# Patient Record
(demographics unavailable — no encounter records)

---

## 2025-02-17 NOTE — ERN
General


Chief Complaint:  Fever


Stated Complaint:  WANTS CHEST X-RAYS/LUNG


Time Seen by MD:  12:23


Source:  patient





History of Present Illness


Initial Comments


Patient is a 73-year-old male coming in to be evaluated for fever.  Per family 

member and patient he has been having fever for a couple of days.  He was 

evaluated at the urgent care and was told had an upper respiratory infection.  

Patient does has a history of cancer in his currently on chemotherapy.  Patient 

was to follow up at the emergency room per MD Gore for evaluation of a 

pneumonia.


Allergies:  


Coded Allergies:  


     No Known Allergies (Unverified  Allergy, Unknown, 22)


Home Meds


Active Scripts


Ibuprofen (Motrin/Advil) 800 Mg Tab, 800 MG PO TIDP PRN for PAIN, #30 TAB


   Prov:DEMAR CASSIDY MD         22


Lidocaine (Lidoderm Patch 5%) 1 Patch Patch, 1 PATCH TP DAILY PRN for PAIN, #10 

ADH.PATCH


   Prov:DEMAR CASSIDY MD         22


Cyclobenzaprine HCl (Flexeril) 10 Mg Tab, 10 MG PO TID, #30 TAB


   Prov:DEMAR CASSIDY MD         22


Reported Medications


Hydrochlorothiazide (Hydrochlorothiazide) 25 Mg Tablet, 25 MG PO DAILY, TAB


   22


Hydroxyzine HCl (Hydroxyzine HCl) 25 Mg Tablet, 25 MG PO DAILY, TAB


   22





Past Medical History


Past Medical History:  Cancer, Hepatitis


Medical History Other:  ESOPHAGEAL CA, METS


Past Surgical History:  Other


Surgical History Other:  HERNIA





Family History


Family History:  HTN





Social History


Social History:  ETOH, Lives with family





ROS Dictation


CONSTITUTIONAL:   chills,  fever,  weakness, no diaphoresis, no malaise.


HEAD/FACE:  No signs of trauma. 


EENT:  No eye pain, no blurred vision, no tearing, no double vision, no ear 

pain, no ear discharge, no nose pain, no nasal congestion, no throat pain, no 

throat swelling, no mouth pain. 


RESPIRATORY:   cough, no orthopnea, no SOB, no stridor, no wheezing. 


CARDIOVASCULAR:  No chest pain, no edema, no palpitations, no syncope. 


GASTROINTESTINAL/ABDOMINAL:   No abdominal pain, no constipation, no diarrhea, 

no nausea, no vomiting. 


GENITOURINARY:  No abnormal discharge, no dysuria, no frequent urination, no 

hematuria. No complaints of pain in the genitals. 


MUSCULOSKELETAL:  No back pain, no gout, no joint pain, no joint swelling, no 

muscle pain, no muscle stiffness, no neck pain. 


INTEGUMENTARY:  No change in color, no change in hair/nails, no dryness, no 

lesion, no lumps, no rash. 


NEUROLOGICAL/PSYCH:  No anxiety, not depressed, no emotional problem, no 

headache, no numbness, no pre-existing deficit, no history of seizures,  no 

tremors, no weakness. 


HEMATOLOGIC/LYMPHATIC:  Not anemic, no history of blood clots, no apparent 

bleeding, no bruising, glands not swollen.


All Systems Negative, Except as Noted.





Physical Exam


Physical Exam Dictation


VITAL SIGNS:  Reviewed. 


GENERAL APPEARANCE:  Alert, oriented x3, no acute distress, obese.


HEAD AND FACE: Non-traumatic. 


EYES:   PERRL, pink conjunctivas, eyelid no trauma, anterior chamber clear. 


EARS:  Pinnas intact and no signs of trauma or erythema.  Ear canals clear and 

no discharge. TMs no erythema. 


NOSE:  No discharge, no bleeding. 


OROPHARYNX:  Mouth normal, teeth no caries, tongue pink.  Pharynx clear, no 

erythema.  Tonsils no exudates, no abscesses noted. Mucous membrane moist.


NECK:  Supple, non-tender, no thyromegaly, no masses, no JVD, no bruits. 


BREAST:  Deferred.


CHEST:   No tenderness, no crepitus, no paradoxical movement, no retractions.


LUNGS:  Clear, well-ventilated, symmetric, no rales, no wheezing, no rhonchi, no

stridor, good breath sounds bilaterally. 


HEART:  Regular rate, regular rhythm, no murmur, no gallops. 


VASCULAR: No peripheral edema.


ABDOMEN: Soft, positive bowel sounds, nondistended, no guarding, nontender, no 

rebound, no masses no hepatomegaly, no splenomegaly, no Gallegos's sign, no 

hernias. 


RECTAL: Deferred. 


GENITAL:  Deferred. 


NEUROLOGICAL:  Normal speech, gross motor function intact, gross sensory 

function intact.


MUSCULOSKELETAL:  Neck nontender, full range of motion, back nontender, full 

range of motion.


EXTREMITIES: Nontender, full range of motion. 


SKIN:  Color pink, dry, no turgor, no rash, no lacerations, no abrasions, no 

contusions.


LYMPHATICS:  Deferred.





Results


Laboratory and Microbiology


Lab and Micro Result





Laboratory Tests








Test


 25


12:31 25


13:09 25


14:22


 


Influenza Type A Antigen


 Negative For


Type A 


 





 


Influenza Type B Antigen


 Negative For


Type B 


 





 


SARS-CoV-2, RNA, NAAT


 NEGATIVE SARS


CoV-2 


 





 


Group A Streptococcus Rapid


 negative


(NEGATIVE) 


 





 


White Blood Count


 


 0.4 K/uL


(4.8-10.8)  *L 





 


Red Blood Count


 


 3.90 MIL/uL


(4.50-6.20)  L 





 


Hemoglobin


 


 11.7 g/dL


(14.0-18.0)  L 





 


Hematocrit


 


 33.9 % (42-54)


L 





 


Mean Corpuscular Volume


 


 86.9 fL


(79-99) 





 


Mean Corpuscular Hemoglobin


 


 30.0 pg


(27.0-33.0) 





 


Mean Corpuscular Hemoglobin


Concent 


 34.5 g/dL


(32.0-36.0) 





 


Red Cell Distribution Width


 


 14.3 %


(11.0-15.5) 





 


Platelet Count


 


 92 K/uL


(130-400)  L 





 


Mean Platelet Volume


 


 10.5 fL


(7.5-10.5) 





 


Immature Granulocyte % (Auto)  0.0 % (0-1)   


 


Neutrophils (%) (Auto)


 


 7.4 %


(40.0-77.0)  L 





 


Lymphocytes (%) (Auto)


 


 78.0 %


(21.0-51.0)  H 





 


Monocytes (%) (Auto)


 


 12.2 %


(3.0-13.0) 





 


Eosinophils (%) (Auto)


 


 0.0 %


(0.0-8.0) 





 


Basophils (%) (Auto)


 


 2.4 %


(0.0-5.0) 





 


Neutrophils # (Auto)


 


 0.0 K/uL


(1.8-7.7)  L 





 


Lymphocytes # (Auto)


 


 0.3 K/uL


(1.0-4.8)  L 





 


Monocytes # (Auto)


 


 0.1 K/uL


(0.1-1.0) 





 


Eosinophils # (Auto)


 


 0.00 K/uL


(0.00-0.70) 





 


Basophils # (Auto)


 


 0.01 K/uL


(0.00-0.20) 





 


Absolute Immature Granulocyte


(auto 


 0.00 K/uL


(0-1) 





 


Segmented Neutrophils %  8 % (40-70)  L 


 


Band Neutrophils %  2 % (0-2)   


 


Lymphocytes % (Manual)  69 % (22-44)  H 


 


Monocytes % (Manual)  12 % (2-9)  H 


 


Eosinophils % (Manual)  2 % (1-6)   


 


Basophils % (Manual)  2 % (0-2)   


 


Metamyelocytes %  1 % (0-0)  H 


 


Nucleated Red Blood Cells


 


 0.0 %


(0.0-0.19) 





 


Differential Comment


 


 MANUAL


DIFFERENTIAL 





 


Reactive Lymphocytes  4 % (0-0)  H 


 


White Cell Morphology Comment  See comments   


 


Platelet Morphology Comment  DECREASED   


 


Red Blood Cell Morphology  See comments   


 


Prothrombin Time


 


 10.3 SEC


(9.6-11.6) 





 


Prothromb Time International


Ratio 


 <= 0.93


(0.85-1.15) 





 


Sodium Level


 


 123 mmol/L


(136-145)  L 





 


Potassium Level


 


 3.9 mmol/L


(3.5-5.1) 





 


Chloride Level


 


 93 mmol/L


(101-111)  L 





 


Carbon Dioxide Level


 


 26 mmol/L


(21-32) 





 


Blood Urea Nitrogen


 


 14 mg/dL


(7-18) 





 


Creatinine


 


 0.8 mg/dL


(0.5-1.3) 





 


Glomerular Filtration Rate


Calc 


 93 mL/min


(>90) 





 


Random Glucose


 


 116 mg/dL


()  H 





 


Lactic Acid Level


 


 1.7 mmol/L


(0.8-2.5) 





 


Total Calcium


 


 8.1 mg/dL


(8.5-10.1)  L 





 


Total Creatine Kinase


 


 56 U/L


() 





 


Troponin I High Sensitivity


 


 26 ng/L (4-75)


 





 


B-Type Natriuretic Peptide


 


 15 pg/mL


(0-100) 





 


Procalcitonin


 


 0.41 ng/mL


(0.05-0.5) 





 


Urine Color


 


 


 YELLOW


(YELLOW)


 


Urine Appearance   CLEAR (CLEAR)  


 


Urine pH   5.5 (5.0-8.0)  


 


Urine Specific Gravity


 


 


 1.029


(1.001-1.031)


 


Urine Protein


 


 


 20 mg/dL


(NEGATIVE)  H


 


Urine Glucose (UA)


 


 


 30 mg/dL


(NEGATIVE)  H


 


Urine Ketones


 


 


 10 mg/dL


(NEGATIVE)  H


 


Urine Occult Blood


 


 


 NEGATIVE


(NEGATIVE)


 


Urine Nitrate


 


 


 NEGATIVE


(NEGATIVE)


 


Urine Bilirubin


 


 


 NEGATIVE mg/dL


(NEGATIVE)


 


Urine Urobilinogen


 


 


 0.2 mg/dL


(0.2-1.0)


 


Urine Leukocyte Esterase


 


 


 NEGATIVE


Kalpesh/uL


 


Urine RBC


 


 


 2-5 /HPF (0-1)


H


 


Urine WBC


 


 


 2-5 /HPF (0-1)


H


 


Urine Squamous Epithelial


Cells 


 


 RARE /HPF


(0-2)


 


Urine Bacteria


 


 


 None /HPF


(None Seen)


 


Urine Other Casts


 


 


 2 /LPF (None


Seen)








Labs Reviewed?:  Yes





EKG/XRAY/US/CT/MRI


EKG Comment


2025 time 12:41 p.m.


Ventricular rate 104


Sinus tachycardia





No ST wave elevation or depression


X-RAY Comment


                            [facility]


                    [address] [place] [address]


                                 [phone]


                                        


                                 IMAGING REPORT


                                     Signed





PATIENT: NIKOLAY SIDHU                            MR#: [id number]


ACCOUNT#: [id number]                              : 1951


SEX: M AGE: 73                                      LOCATION: [facility]


ORDER DT: 25 1241                             STATUS: REG ER


ACCESSION#: [id number]                            REPORT#: [id number]


SERVICE DT: 25 1239





REASON: fever


ORDERING PHYSICIAN: MARIE DOZIER MD


PROCEDURE: CXR1VW  -  CHEST 1VW





CHEST 1VW





REASON: fever





COMPARISON: None.





FINDINGS:  


There is 2 cm well-circumscribed mass midportion of the right lung.


There is some faint nodularity in the left lung which could be some


smaller nodules. There are no acute appearing infiltrates. Heart size is


normal. There is a chest port in place. Be some and bony thorax appear


unremarkable.





IMPRESSION:





1. 2 cm nodule right midlung, there may be some smaller nodules present


in the left lung.


2. No evidence of an acute infiltrate.








DICTATED BY: EMMETT ARTHUR MD


DATE: 25 1318





ELECTRONICALLY SIGNED BY: EMMETT ARTHUR MD


DATE: 25 1322





MDM


MDM: 


DIFFERENTIAL DIAGNOSIS:  SEPSIS, HISTORY OF CANCER, IMMUNODEFICIENCY


RATIONALE: TESTS CONSIDERED AND ORDERED SECONDARY TO SHARED DECISION MAKING 

INCLUDE:


PREVIOUS OUTSIDE RECORDS REVIEWED: OLD ER VISITS.


RISK OF COMPLICATION AND/OR MORBIDITY OR MORTALITY OF PATIENT MANAGEMENT: NONE


MEDICATIONS-PER MEDICATION RECONCILIATION


NEED FOR HOSPITALIZATION: PATIENT DOES MEET CRITERIA FOR HOSPITALIZATION.


NEED FOR EMERGENCY MAJOR/MINOR SURGERY: NO





THERE ARE NO SOCIAL CONCERNS WITH THIS PATIENT.





PRESCRIPTION DRUG MANAGEMENT


PRESCRIPTIONS WILL INCLUDE SYMPTOMATIC CARE





PATIENT'S PRIOR EXTERNAL MEDICAL RECORDS FROM OTHER ER VISITS WERE REVIEWED BY 

ME AS INDICATED.  PRIOR TESTING AND RESULTS FROM PREVIOUS VISITS WERE REVIEWED. 

PRIOR TESTS WERE TAKEN INTO ACCOUNT WITH MEDICAL DECISION MAKING AND RESOURCE 

UTILIZATION, INDEPENDENT HISTORIAN/HISTORIANS WERE USED TO OBTAIN COMPLETE 

MEDICAL HISTORY.





I INDEPENDENTLY INTERPRETED THE TEST THAT WERE PERFORMED, RESULTS WERE REVIEWED 

BY ME AND CONSIDERED FINDINGS ON RADIOLOGY IF ORDERED.





MEDICAL MANAGEMENT AND EXAMINATION INTERPRETATION DISCUSSIONS WERE HAD BY ME 

WITH OTHER QUALIFIED HEALTHCARE PROFESSIONALS AS INDICATED FOR THE PATIENT'S 

CARE.  PATIENT WILL BE ADMITTED UNDER THE CARE OF DR. POOL FOR ONGOING 

EVALUATION AND MANAGEMENT OF SEPSIS WITH IMMUNODEFICIENCY


ED Course





Orders








Procedure Category Date Status





  Time 


 


12 Lead Ekg Tracing- EKG 25 Complete





Technical  12:33 


 


Cbc With Differential LAB 25 Complete





  12:33 


 


Prothrombin Time With LAB 25 Complete





INR  12:33 


 


Blood Cult JANINE 25 In Process





  12:33 


 


Urinalysis Profile LAB 25 Complete





  12:33 


 


Culture Urine JANINE 25 In Process





  12:33 


 


Acetaminophen 500mg PHA 25 Complete





Tab (Tylenol 500mg T  13:00 


 


Creatine Kinase, Total LAB 25 Complete





  12:33 


 


Troponin I High LAB 25 Complete





Sensitivity  12:33 


 


B-Type Natriuretic LAB 25 Complete





Peptide  12:33 


 


Procalcitonin LAB 25 Complete





  12:33 


 


Lactic Acid LAB 25 Complete





  12:33 


 


Ceftriaxone 1g Vial PHA 25 Complete





 (Rocephine 1g Inj)  13:00 


 


Basic Metabolic Panel LAB 25 Complete





  12:33 


 


Covid Rna Naat LAB 25 Complete





  12:33 


 


Influenza Type A & B, LAB 25 Complete





Rapid  12:33 


 


Febrile Agglutinins LAB 25 In Process





Panel  12:33 


 


Chest 1vw RAD 25 Resulted





  12:39 


 


Rapid (Group A Strep) LAB 25 Complete





  12:41 


 


Manual Differential LAB 25 Complete





  13:09 


 


0.9%Nacl 1000ml (Ns PHA 25 Complete





1000ml)  15:30 








Current Medications








 Medications


  (Trade)  Dose


 Ordered  Sig/Lobo


 Route


 PRN Reason  Start Time


 Stop Time Status Last Admin


Dose Admin


 


 Acetaminophen


  (TYLenol 500MG


 TAB)  1,000 mg  ONCE  ONCE


 PO


   25 13:00


 25 13:01 DC 25 14:14





 


 Ceftriaxone Sodium


  (ROCEphine 1G


 INJ)  1 gm  ONCE  ONCE


 IVPB


   25 13:00


 25 13:01 DC 25 14:09





 


 Sodium Chloride  1,000 ml @ 


 0 mls/hr  ONCE  ONCE


 IV


   25 15:30


 25 15:31 DC 25 15:27











Vital Signs








  Date Time  Temp Pulse Resp B/P (MAP) Pulse Ox O2 Delivery O2 Flow Rate FiO2


 


25 16:15 99.0 89 18 110/65 98 Room Air* 0 21


 


25 15:31 99.1 98 18 100/65 98 Room Air* 0 21


 


25 14:23 99.9 107 20 117/62 98 Room Air* 0 21


 


25 12:33 101.1 109 20 134/68 98 Room Air 0 











Critical Care Note


Comments


CRITICAL CARE PROCEDURE NOTE


AUTHORIZED AND PERFORMED BY: ME


TOTAL CRITICAL CARE TIME: APPROXIMATELY 36 MINUTES


DUE TO A HIGH PROBABILITY OF CLINICALLY SIGNIFICANT, LIFE THREATENING 

DETERIORATION, THE PATIENT REQUIRED MY HIGHEST LEVEL OF PREPAREDNESS TO 

INTERVENE EMERGENTLY AND I PERSONALLY SPENT THIS CRITICAL CARE TIME DIRECTLY AND

PERSONALLY MANAGING THE PATIENT. THIS CRITICAL CARE TIME INCLUDED OBTAINING A 

HISTORY; EXAMINING THE PATIENT; PULSE OXIMETRY; ORDERING AND REVIEW OF STUDIES; 

ARRANGING URGENT TREATMENT WITH DEVELOPMENT OF A MANAGEMENT PLAN; EVALUATION OF 

PATIENT'S RESPONSE TO TREATMENT; FREQUENT REASSESSMENT; AND, DISCUSSIONS WITH 

OTHER PROVIDERS.


THIS CRITICAL CARE TIME WAS PERFORMED TO ASSESS AND MANAGE THE HIGH PROBABILITY 

OF IMMINENT, LIFE-THREATENING DETERIORATION THAT COULD RESULT IN MULTI-ORGAN 

FAILURE. IT WAS EXCLUSIVE OF SEPARATELY BILLABLE PROCEDURES AND TREATING OTHER 

PATIENTS AND TEACHING TIME.


PLEASE SEE MDM SECTION AND THE REST OF THE NOTE FOR FURTHER INFORMATION ON 

PATIENT ASSESSMENT AND TREATMENT.





DX & DISP


Disposition:  Inpatient


Decision to Admit Date:  2025


Decision to Admit Time:  16:32


Departure


Impression:  


   Primary Impression:  Sepsis


   Additional Impressions:  Immunodeficiency, Maintenance chemotherapy


Condition:  Stable


Referrals:  


GOMEZ DEVLIN MD (PCP)


Time of Disposition:  16:31











MARIE DOZIER MD              2025 12:58

## 2025-02-17 NOTE — NUR
SPOKE TO DR. VINCENT SADLER (PTS ONCOLOGIST IN [place]) PER ANSWERING 
SERVICE, SHE WILL SUBMIT REQUEST FOR PHYSCIAN TO COME SEE PT.

## 2025-02-17 NOTE — EKG
[facility]

                                       

Test Date:    2025               Test Time:    12:41:44

Pat Name:     NIKOLAY SIDHU            Department:   [facility]

Patient ID:   [id number]           Room:         ED

Gender:       M                        Technician:   [id number]

:          1951               Requested By: MARIE DOZIER

Order Number: [id number]        Reading MD:   Philippe Hassan

                                 Measurements

Intervals                              Axis          

Rate:         104                      P:            59

FL:           131                      QRS:          59

QRSD:         85                       T:            55

QT:           331                                    

QTc:          435                                    

                           Interpretive Statements

Sinus tachycardia

No previous ECG available for comparison

Electronically Signed On 2025 06:56:45 CST by Philippe Hassan



Please click the below link to view image of tracing.

## 2025-02-17 NOTE — HP
HISTORY AND PHYSICAL NOTE


DATE OF CONSULTATION:


2/17/25


REASON FOR CONSULTATION:


fever cough and expectoration


HISTORY OF PRESENT ILLNESS:


Patient is a 73-year-old male coming in to be evaluated for fever.  Per family 

member and patient he has been having fever for a couple of days.  He was 

evaluated at the urgent care and was told had an upper respiratory infection.  

Patient does has a history of cancer in his currently on chemotherapy.  Patient 

was to follow up at the emergency room per MD Gore for evaluation of a 

pneumonia.


Allergies:  


Coded Allergies:  


     No Known Allergies (Unverified  Allergy, Unknown, 9/24/22)


Home Meds


Active Scripts


Ibuprofen (Motrin/Advil) 800 Mg Tab, 800 MG PO TIDP PRN for PAIN, #30 TAB


   Prov:DEMAR CASSIDY MD         9/24/22


Lidocaine (Lidoderm Patch 5%) 1 Patch Patch, 1 PATCH TP DAILY PRN for PAIN, #10 

ADH.PATCH


   Prov:DEMAR CASSIDY MD         9/24/22


Cyclobenzaprine HCl (Flexeril) 10 Mg Tab, 10 MG PO TID, #30 TAB


   Prov:DEMAR CASSIDY MD         9/24/22


Reported Medications


Hydrochlorothiazide (Hydrochlorothiazide) 25 Mg Tablet, 25 MG PO DAILY, TAB


   9/24/22


Hydroxyzine HCl (Hydroxyzine HCl) 25 Mg Tablet, 25 MG PO DAILY, TAB


   9/24/22





Past History


Past Medical History


Past Medical History:  Cancer, Hepatitis


Medical History Other:  ESOPHAGEAL CA, METS


Past Surgical History:  Other


Surgical History Other:  HERNIA





Family History


Family History:  HTN





Social History


Social History:  ETOH, Lives with family





Review of Systems


ROS Dictation


CONSTITUTIONAL:   chills,  fever,  weakness, no diaphoresis, no malaise.


ALLERGIES:  


Coded Allergies:  


     No Known Allergies (Unverified  Allergy, Unknown, 9/24/22)


HOME MEDS:


Active Scripts


Ibuprofen (Motrin/Advil) 800 Mg Tab, 800 MG PO TIDP PRN for PAIN, #30 TAB


   Prov:DEMAR CASSIDY MD         9/24/22


Lidocaine (Lidoderm Patch 5%) 1 Patch Patch, 1 PATCH TP DAILY PRN for PAIN, #10 

ADH.PATCH


   Prov:DEMAR CASSIDY MD         9/24/22


Cyclobenzaprine HCl (Flexeril) 10 Mg Tab, 10 MG PO TID, #30 TAB


   Prov:DEMAR CASSIDY MD         9/24/22


Reported Medications


Hydrochlorothiazide (Hydrochlorothiazide) 25 Mg Tablet, 25 MG PO DAILY, TAB


   9/24/22


Hydroxyzine HCl (Hydroxyzine HCl) 25 Mg Tablet, 25 MG PO DAILY, TAB


   9/24/22


INPATIENT MEDS:





Current Medications








 Medications  Dose


 Ordered  Sig/Lobo  Start Time


 Stop Time Status Last Admin


 


 Albuterol  1 udvial  [id number]  2/17/25 18:00


 3/19/25 17:59  2/17/25 19:22





 


 Albuterol  1 udvial  Q4HPRN  PRN  2/17/25 17:00


 3/19/25 16:59   





 


 Azithromycin  250 ml @ 


 250 mls/hr  Q24H  2/17/25 17:00


 2/27/25 16:59  2/17/25 17:38





 


 Enoxaparin Sodium  40 mg  DAILY  2/18/25 09:00


 3/20/25 08:59   





 


 Ceftriaxone Sodium  1 gm  Q24H  2/18/25 13:00


 2/28/25 12:59   





 


 Hydromorphone HCl  0.5 mg  Q3H3  PRN  2/17/25 22:00


 2/22/25 21:59   





 


 Acetaminophen  650 mg  Q6H  PRN  2/17/25 22:00


 3/19/25 21:59  2/17/25 21:46





 


 Acetaminophen  650 mg  Q6H  PRN  2/17/25 22:00


 3/19/25 21:59   





 


 Ondansetron HCl  4 mg  Q6H  PRN  2/17/25 22:00


 3/19/25 21:59   











VITAL SIGNS





Vital Signs








  Date Time  Temp Pulse Resp B/P (MAP) Pulse Ox O2 Delivery O2 Flow Rate FiO2


 


2/17/25 20:31 99.3 91 20 118/54 98 Room Air* 0 21 2/17/25 19:29  87 18   N/A Room Air  21 2/17/25 19:28  87 18     


 


2/17/25 18:00 99.0 89 18 114/70 97 Room Air* 0 21 2/17/25 16:15 99.0 89 18 110/65 98 Room Air* 0 21 2/17/25 15:31 99.1 98 18 100/65 98 Room Air* 0 21 2/17/25 14:23 99.9 107 20 117/62 98 Room Air* 0 21 2/17/25 12:33 101.1 109 20 134/68 98 Room Air 0 








PHYSICAL EXAM


HEAD/FACE:  No signs of trauma. 


EENT:  No eye pain, no blurred vision, no tearing, no double vision, no ear 

pain, no ear discharge, no nose pain, no nasal congestion, no throat pain, no 

throat swelling, no mouth pain. 


RESPIRATORY:   cough, no orthopnea, no SOB, no stridor, no wheezing. 


CARDIOVASCULAR:  No chest pain, no edema, no palpitations, no syncope. 


GASTROINTESTINAL/ABDOMINAL:   No abdominal pain, no constipation, no diarrhea, 

no nausea, no vomiting. 


GENITOURINARY:  No abnormal discharge, no dysuria, no frequent urination, no 

hematuria. No complaints of pain in the genitals. 


MUSCULOSKELETAL:  No back pain, no gout, no joint pain, no joint swelling, no 

muscle pain, no muscle stiffness, no neck pain. 


INTEGUMENTARY:  No change in color, no change in hair/nails, no dryness, no 

lesion, no lumps, no rash. 


NEUROLOGICAL/PSYCH:  No anxiety, not depressed, no emotional problem, no 

headache, no numbness, no pre-existing deficit, no history of seizures,  no 

tremors, no weakness. 


HEMATOLOGIC/LYMPHATIC:  Not anemic, no history of blood clots, no apparent 

bleeding, no bruising, glands not swollen.


All Systems Negative, Except as Noted.


LABORATORY RESULTS


Laboratory Tests


2/17/25 12:31: 


Influenza Type A Antigen Negative For Type A, Influenza Type B Antigen Negative 

For Type B, SARS-CoV-2, RNA, NAAT NEGATIVE SARS CoV-2, Group A Streptococcus 

Rapid negative


2/17/25 13:09: 


White Blood Count 0.4, Red Blood Count 3.90, Hemoglobin 11.7, Hematocrit 33.9, 

Mean Corpuscular Volume 86.9, Mean Corpuscular Hemoglobin 30.0, Mean Corpuscular

Hemoglobin Concent 34.5, Red Cell Distribution Width 14.3, Platelet Count 92, 

Mean Platelet Volume 10.5, Immature Granulocyte % (Auto) 0.0, Neutrophils (%) 

(Auto) 7.4, Lymphocytes (%) (Auto) 78.0, Monocytes (%) (Auto) 12.2, Eosinophils 

(%) (Auto) 0.0, Basophils (%) (Auto) 2.4, Neutrophils # (Auto) 0.0, Lymphocytes 

# (Auto) 0.3, Monocytes # (Auto) 0.1, Eosinophils # (Auto) 0.00, Basophils # (

Auto) 0.01, Absolute Immature Granulocyte (auto 0.00, Segmented Neutrophils % 8,

Band Neutrophils % 2, Lymphocytes % (Manual) 69, Monocytes % (Manual) 12, 

Eosinophils % (Manual) 2, Basophils % (Manual) 2, Metamyelocytes % 1, Nucleated 

Red Blood Cells 0.0, Differential Comment MANUAL DIFFERENTIAL, Reactive 

Lymphocytes 4, White Cell Morphology Comment See comments, Platelet Morphology 

Comment DECREASED, Red Blood Cell Morphology See comments, Prothrombin Time 

10.3, Prothromb Time International Ratio <= 0.93, Sodium Level 123, Potassium 

Level 3.9, Chloride Level 93, Carbon Dioxide Level 26, Blood Urea Nitrogen 14, 

Creatinine 0.8, Glomerular Filtration Rate Calc 93, Random Glucose 116, Lactic 

Acid Level 1.7, Total Calcium 8.1, Total Creatine Kinase 56, Troponin I High 

Sensitivity 26, B-Type Natriuretic Peptide 15, Procalcitonin 0.41


2/17/25 14:22: 


Urine Color YELLOW, Urine Appearance CLEAR, Urine pH 5.5, Urine Specific Gravity

1.029, Urine Protein 20, Urine Glucose (UA) 30, Urine Ketones 10, Urine Occult 

Blood NEGATIVE, Urine Nitrate NEGATIVE, Urine Bilirubin NEGATIVE, Urine 

Urobilinogen 0.2, Urine Leukocyte Esterase NEGATIVE, Urine RBC 2-5, Urine WBC 2-

5, Urine Squamous Epithelial Cells RARE, Urine Bacteria None, Urine Other Casts 

2


PROBLEM LIST:  


(1) Pneumonia


ICD Codes:  J18.9 - Pneumonia, unspecified organism


(2) Sepsis


ICD Codes:  A41.9 - Sepsis, unspecified organism


(3) Immunodeficiency


ICD Codes:  D84.9 - Immunodeficiency, unspecified


(4) Maintenance chemotherapy


ICD Codes:  Z51.11 - Encounter for antineoplastic chemotherapy


PLAN


IV antibiotics


 hydrate


 consult oncology











IZZY REAGAN MD              Feb 17, 2025 22:09

## 2025-02-17 NOTE — [facility]
CHEST 1VW



REASON: fever



COMPARISON: None.



FINDINGS:  

There is 2 cm well-circumscribed mass midportion of the right lung.

There is some faint nodularity in the left lung which could be some

smaller nodules. There are no acute appearing infiltrates. Heart size is

normal. There is a chest port in place. Be some and bony thorax appear

unremarkable.



IMPRESSION:



1. 2 cm nodule right midlung, there may be some smaller nodules present

in the left lung.

2. No evidence of an acute infiltrate.

## 2025-02-18 NOTE — NUR
RECEIVED A CALL FROM [facility], DR. SADLER DOES NOT COME TO [facility], SHE WILL RELAY THE MESSAGE TO DR. LIMON OR DR. MENDEZ TO COME SEE 
PT.

## 2025-02-18 NOTE — PN
PROGRESS NOTE


PROGRESS NOTE


DATE OF PROGRESS NOTE:


2/18/25


SUBJECTIVE:


No new complaints


VITAL SIGNS





Vital Signs








  Date Time  Temp Pulse Resp B/P (MAP) Pulse Ox O2 Delivery O2 Flow Rate FiO2


 


2/18/25 20:00 99.7 88 19 133/65 99 Room Air  


 


2/18/25 19:41        21


 


2/18/25 15:27       0.0 








PHYSICAL EXAM:


HEAD/FACE:  No signs of trauma. 


EENT:  No eye pain, no blurred vision, no tearing, no double vision, no ear 

pain, no ear discharge, no nose pain, no nasal congestion, no throat pain, no 

throat swelling, no mouth pain. 


RESPIRATORY:   cough, no orthopnea, no SOB, no stridor, no wheezing. 


CARDIOVASCULAR:  No chest pain, no edema, no palpitations, no syncope. 


GASTROINTESTINAL/ABDOMINAL:   No abdominal pain, no constipation, no diarrhea, 

no nausea, no vomiting. 


GENITOURINARY:  No abnormal discharge, no dysuria, no frequent urination, no 

hematuria. No complaints of pain in the genitals. 


MUSCULOSKELETAL:  No back pain, no gout, no joint pain, no joint swelling, no 

muscle pain, no muscle stiffness, no neck pain. 


INTEGUMENTARY:  No change in color, no change in hair/nails, no dryness, no 

lesion, no lumps, no rash. 


NEUROLOGICAL/PSYCH:  No anxiety, not depressed, no emotional problem, no 

headache, no numbness, no pre-existing deficit, no history of seizures,  no 

tremors, no weakness. 


HEMATOLOGIC/LYMPHATIC:  Not anemic, no history of blood clots, no apparent 

bleeding, no bruising, glands not swollen.


All Systems Negative, Except as Noted.


LABORATORY:





                              Laboratory Result(s)








Test


 2/18/25


06:59


 


White Blood Count


 0.8 K/uL


(4.8-10.8)


 


Red Blood Count


 3.49 MIL/uL


(4.50-6.20)


 


Hemoglobin


 10.6 g/dL


(14.0-18.0)


 


Hematocrit 30.6 % (42-54) 


 


Mean Corpuscular Volume


 87.7 fL


(79-99)


 


Mean Corpuscular Hemoglobin


 30.4 pg


(27.0-33.0)


 


Mean Corpuscular Hemoglobin


Concent 34.6 g/dL


(32.0-36.0)


 


Red Cell Distribution Width


 14.1 %


(11.0-15.5)


 


Platelet Count


 85 K/uL


(130-400)


 


Mean Platelet Volume


 10.3 fL


(7.5-10.5)


 


Immature Granulocyte % (Auto) 0.0 % (0-1) 


 


Neutrophils (%) (Auto)


 3.8 %


(40.0-77.0)


 


Lymphocytes (%) (Auto)


 80.8 %


(21.0-51.0)


 


Monocytes (%) (Auto)


 15.4 %


(3.0-13.0)


 


Eosinophils (%) (Auto)


 0.0 %


(0.0-8.0)


 


Basophils (%) (Auto)


 0.0 %


(0.0-5.0)


 


Neutrophils # (Auto)


 0.0 K/uL


(1.8-7.7)


 


Lymphocytes # (Auto)


 0.6 K/uL


(1.0-4.8)


 


Monocytes # (Auto)


 0.1 K/uL


(0.1-1.0)


 


Eosinophils # (Auto)


 0.00 K/uL


(0.00-0.70)


 


Basophils # (Auto)


 0.00 K/uL


(0.00-0.20)


 


Absolute Immature Granulocyte


(auto 0.00 K/uL


(0-1)


 


Nucleated Red Blood Cells


 0.0 %


(0.0-0.19)


 


White Cell Morphology Comment See comments 


 


Sodium Level


 126 mmol/L


(136-145)


 


Potassium Level


 4.0 mmol/L


(3.5-5.1)


 


Chloride Level


 97 mmol/L


(101-111)


 


Carbon Dioxide Level


 24 mmol/L


(21-32)


 


Blood Urea Nitrogen 9 mg/dL (7-18) 


 


Creatinine


 0.6 mg/dL


(0.5-1.3)


 


Glomerular Filtration Rate


Calc 102 mL/min


(>90)


 


Random Glucose


 103 mg/dL


()


 


Total Calcium


 7.7 mg/dL


(8.5-10.1)








INPATIENT MEDS:





Current Medications








 Medications  Dose


 Ordered  Sig/Lobo  Start Time


 Stop Time Status Last Admin


 


 Albuterol  1 udvial  [id number]  2/17/25 18:00


 3/19/25 17:59  2/18/25 19:36





 


 Albuterol  1 udvial  Q4HPRN  PRN  2/17/25 17:00


 3/19/25 16:59   





 


 Azithromycin  250 ml @ 


 250 mls/hr  Q24H  2/17/25 17:00


 2/22/25 16:59  2/18/25 17:29





 


 Enoxaparin Sodium  40 mg  DAILY  2/18/25 09:00


 3/20/25 08:59  2/18/25 09:54





 


 Hydromorphone HCl  0.5 mg  Q3H3  PRN  2/17/25 22:00


 2/22/25 21:59   





 


 Acetaminophen  650 mg  Q6H  PRN  2/17/25 22:00


 3/19/25 21:59  2/18/25 16:27





 


 Acetaminophen  650 mg  Q6H  PRN  2/17/25 22:00


 3/19/25 21:59   





 


 Ondansetron HCl  4 mg  Q6H  PRN  2/17/25 22:00


 3/19/25 21:59   





 


 Cefepime HCl  2 gm  Q12H  2/18/25 14:00


 2/28/25 13:59  2/18/25 14:38





 


 Fluconazole/


 Sodium Chloride  400 mg  Q24H  2/18/25 14:00


 2/28/25 13:59  2/18/25 15:20











PROBLEM LIST:  


(1) Pneumonia


ICD Code:  J18.9 - Pneumonia, unspecified organism


(2) Sepsis


ICD Code:  A41.9 - Sepsis, unspecified organism


(3) Immunodeficiency


ICD Code:  D84.9 - Immunodeficiency, unspecified


(4) Maintenance chemotherapy


ICD Code:  Z51.11 - Encounter for antineoplastic chemotherapy


PLAN:


IV antibiotics


 hydrate


 consult oncologyAnd pulmonology


Sepsis


Neutropenic fever


Severe neutropenia, POA


Esophageal CA with Mets


Immunodeficiency


Maintenance chemotherapy 


suspected community acquired pneumonia POA


History of hepatitis 


Hypertension 


Chronic Generalized rash to face and scalp worsening, POA











IZZY REAGAN MD              Feb 18, 2025 22:41

## 2025-02-18 NOTE — HMCIMG
CT CHEST W/O CONTRAST



REASON:  rule out ILD



COMPARISON: None.



TECHNIQUE:

Multiple sequential axial images of the chest were obtained from the

thoracic inlet through the upper pole of the kidneys without intravenous

contrast administration.



FINDINGS:

There are multiple bilateral pulmonary nodules consistent with

metastatic disease.  Many of the nodules are subcentimeter in size,

there are several additional nodules between 1 and 2 cm.  Lungs appear

otherwise unremarkable.  There is normal-appearing pulmonary

interstitial pattern.  There are no acute appearing infiltrates.



There are no pleural effusions.  Hilar and mediastinal structures appear

unremarkable.  Chest wall structures appear normal as do visualized

upper abdominal structures.  There are no focal osseous lesions.



IMPRESSION:



1.  Multiple bilateral pulmonary nodules consistent with metastatic

disease.

2.  Otherwise unremarkable exam.



CT was performed with one or more following dose reduction techniques:

automated exposure control, adjustment of the mA and kv according to

patient's size, or use of a iterative reconstruction technique.

## 2025-02-18 NOTE — CONS
[facility] CONSULTATION NOTE 





Date Patient Seen: 2025 


Time of Visit: 08:20


Supervising Physician: Yash Cadet MD


Reason for Consultation: Lung CA and Pneumonia





Primary Care Physician: Wojciech Harper MD 


Outpatient Specialists: 


Inpatient Consults: Jaquan Bills MD





PROBLEM LIST:


Sepsis


Neutropenic fever


Severe neutropenia, POA


Esophageal CA with Mets


Immunodeficiency


Maintenance chemotherapy 


suspected community acquired pneumonia POA


History of hepatitis 


Hypertension 


Chronic Generalized rash to face and scalp worsening, POA





HPI:


This is a 73-year-old male with a past medical history of esophageal cancer with

metastasis, with history of chemotherapy and immunocompromise, hypertension, and

hepatitis who presented to the ED for evaluation of fever for a couple of days. 

As per wife patient was evaluated at a urgent care clinic and was said he had an

upper respiratory infection and sent home.  As per patient's wife they called MD Gore for evaluation of a pneumonia and they recommended for patient to come 

in to the ED for further workup.  Patient was admitted for septic shock to the 

ICU we were consulted by primary team for critical care management.  Chest x-ray

2 cm nodule right midlung there may be some smaller nodules present in the left 

lung.  No evidence of acute infiltrate.  Multiple bilateral pulmonary nodules 

consistent with metastatic disease.  Otherwise unremarkable exam.  On CT chest 

multiple bilateral pulmonary nodules consistent with metastatic disease seen.  

Otherwise unremarkable exam.  On laboratory WBCs are 0.8 H&H is 10.6/30.6 

platelet count is 85 K. neutrophils of 3.8.  Pending oncology consult.  Na 126 

chloride 97 kidneys are good creatinine 0.6  total calcium 7.7.  For 

neutropenic fever we will cover with broad-spectrum antibiotics for now and 

consult ID.





PAST MEDICAL HX:


see above





PAST SURGICAL HX:


noncontributory





SOCIAL HISTORY:


No tobacco, ETOH, or illicit drug use


Coded Allergies:  


     No Known Allergies (Unverified  Allergy, Unknown, 22)





REVIEW OF SYSTEMS: 


12 point ROS reviewed with patient. Pertinent positives mentioned above. 

Otherwise negative.





PHYSICAL EXAM: 


GENERAL: alert, weak, awake oriented x 3


HEENT: EOMI, Sclera non icteric, moist mucosa , generalized rash to face and 

scalp


NECK: Supple, no JVD, trachea midline 


LUNGS: diminished  breath sounds bilaterally. No wheezes


HEART: Regular rate and rhythm. Normal S1 and S2, without murmurs 


ABD: Abdomen soft, nontender. Bowel sounds present


EXT: No clubbing cyanosis or edema


NEURO: Alert and oriented to person, follows commands





                             Vital Signs (last 8hr)








  Date Time  Temp Pulse Resp B/P (MAP) Pulse Ox O2 Delivery O2 Flow Rate FiO2


 


25 06:40  84 18   N/A Room Air  21


 


25 06:40  84 18     


 


25 03:42 99.1 92 20 118/54 98 Room Air* 0 21











LABS:





                                Hematology Labs:








Test


 25


06:59 25


13:09 Range/Units


 


 


White Blood Count 0.8 #*L  4.8-10.8  K/uL


 


Red Blood Count


 3.49 L


 


 4.50-6.20


MIL/uL


 


Hemoglobin 10.6 L  14.0-18.0  g/dL


 


Hematocrit 30.6 L  42-54  %


 


Mean Corpuscular Volume 87.7   79-99  fL


 


Mean Corpuscular Hemoglobin 30.4   27.0-33.0  pg


 


Mean Corpuscular Hemoglobin


Concent 34.6 


 


 32.0-36.0  g/dL





 


Red Cell Distribution Width 14.1   11.0-15.5  %


 


Platelet Count 85 L  130-400  K/uL


 


Mean Platelet Volume 10.3   7.5-10.5  fL


 


Immature Granulocyte % (Auto) 0.0   0-1  %


 


Neutrophils (%) (Auto) 3.8 L  40.0-77.0  %


 


Lymphocytes (%) (Auto) 80.8 H  21.0-51.0  %


 


Monocytes (%) (Auto) 15.4 H  3.0-13.0  %


 


Eosinophils (%) (Auto) 0.0   0.0-8.0  %


 


Basophils (%) (Auto) 0.0   0.0-5.0  %


 


Neutrophils # (Auto) 0.0 L  1.8-7.7  K/uL


 


Lymphocytes # (Auto) 0.6 L  1.0-4.8  K/uL


 


Monocytes # (Auto) 0.1   0.1-1.0  K/uL


 


Eosinophils # (Auto) 0.00   0.00-0.70  K/uL


 


Basophils # (Auto) 0.00   0.00-0.20  K/uL


 


Absolute Immature Granulocyte


(auto 0.00 


 


 0-1  K/uL





 


Nucleated Red Blood Cells 0.0   0.0-0.19  %


 


Segmented Neutrophils %  8 L 40-70  %


 


Band Neutrophils %  2  0-2  %


 


Lymphocytes % (Manual)  69 H 22-44  %


 


Monocytes % (Manual)  12 H 2-9  %


 


Eosinophils % (Manual)  2  1-6  %


 


Basophils % (Manual)  2  0-2  %


 


Metamyelocytes %  1 H 0-0  %


 


Differential Comment


 


 MANUAL


DIFFERENTIAL  





 


Reactive Lymphocytes  4 H 0-0  %


 


Platelet Morphology Comment  DECREASED   


 


Red Blood Cell Morphology  See comments   








                                 Chemistry Labs:








Test


 25


06:59 25


13:09 Range/Units


 


 


Sodium Level 126 L  136-145  mmol/L


 


Potassium Level 4.0   3.5-5.1  mmol/L


 


Chloride Level 97 L  101-111  mmol/L


 


Carbon Dioxide Level 24   21-32  mmol/L


 


Blood Urea Nitrogen 9   7-18  mg/dL


 


Creatinine 0.6   0.5-1.3  mg/dL


 


Glomerular Filtration Rate


Calc 102 


 


 >90  mL/min





 


Random Glucose 103     mg/dL


 


Total Calcium 7.7 L  8.5-10.1  mg/dL


 


Lactic Acid Level  1.7  0.8-2.5  mmol/L


 


Total Creatine Kinase  56    U/L


 


Troponin I High Sensitivity  26  4-75  ng/L


 


B-Type Natriuretic Peptide  15  0-100  pg/mL


 


Procalcitonin  0.41  0.05-0.5  ng/mL








                                Coagulation Labs:








Test


 25


13:09 Range/Units


 


 


Prothrombin Time 10.3  9.6-11.6  SEC


 


Prothromb Time International


Ratio <= 0.93 


 0.85-1.15  














DIAGNOSTICS / RADIOLOGY RESULTS:





PATIENT: NIKOLAY SIDHU                            MR#: [id number]


ACCOUNT#: [id number]                              : 1951


SEX: M AGE: 73                                      LOCATION: [facility]


ORDER DT: 25                             STATUS: ADM IN


ACCESSION#: [id number]                            REPORT#: [id number]


SERVICE DT: 25





REASON: rule out ILD


ORDERING PHYSICIAN: MARTHA GRAAHM


PROCEDURE: CHEST WO  -  CT CHEST W/O CONTRAST





CT CHEST W/O CONTRAST





REASON:  rule out ILD





COMPARISON: None.





TECHNIQUE:


Multiple sequential axial images of the chest were obtained from the


thoracic inlet through the upper pole of the kidneys without intravenous


contrast administration.





FINDINGS:


There are multiple bilateral pulmonary nodules consistent with


metastatic disease.  Many of the nodules are subcentimeter in size,


there are several additional nodules between 1 and 2 cm.  Lungs appear


otherwise unremarkable.  There is normal-appearing pulmonary


interstitial pattern.  There are no acute appearing infiltrates.





There are no pleural effusions.  Hilar and mediastinal structures appear


unremarkable.  Chest wall structures appear normal as do visualized


upper abdominal structures.  There are no focal osseous lesions.





IMPRESSION:





1.  Multiple bilateral pulmonary nodules consistent with metastatic


disease.


2.  Otherwise unremarkable exam.





CT was performed with one or more following dose reduction techniques:


automated exposure control, adjustment of the mA and kv according to


patient's size, or use of a iterative reconstruction technique.








DICTATED BY: EMMETT ARTHUR MD


DATE: 25 1154





ELECTRONICALLY SIGNED BY: EMMETT ARTHUR MD


DATE: 25 1201





PLAN 


Follow oncology recommendations 


Broad spectrum antibiotics


follow ID recs 


panculture 


Negative for thyphoid, Influenza A and B, covid and strep


CT of the chest 


CBC


CMP


TSH





NEURO: 


Minimize central acting medications as possible. 


Maintain fall precautions, adequate lighting during the day





PULMONARY: 


Supplemental 02 as needed. 


Maintain aspiration precautions at all times





CARDIOVASCULAR: 


Follow hemodynamics. 


Vital signs per facility protocol





GI & NUTRITION:


Continue with nutritional support.


Continue stool softeners and laxatives as needed.





KIDNEYS & ELECTROLYTES: 


Strict monitoring of intake, output and overall fluid balance. 


Avoid nephrotoxic medications to the extent possible. 


Medications to be dosed according to renal function.


Monitor electrolytes and replace as needed





ENDOCRINE:


Maintain blood glucose between 100-180 at all times.


Hypoglycemia protocol in place





INFECTIOUS DISEASE: 


Trend temperature, WBC and procalcitonin level


Follow cultures, deescalate antibiotics as soon as possible.


Panculture if new onset fever





ONCOLOGY/HEMATOLOGY/COAGULATION: 


Monitor for s/s of bleeding


Monitor hemoglobin, coagulation studies as needed





SKIN:


Pressure ulcer prevention per facility protocol


Specialty mattress





ORTHO/REHAB:


Continue PT/OT 





Prophylaxis:


Continue GI and DVT prophylaxis





Code Status: 


Full Resuscitation





Disposition:


TBD





Other:


Total patient care time exceeds 35 minutes excluding all procedures.











MARTHA GRAHAM              2025 08:20

## 2025-02-18 NOTE — NUR
DCP: HOME



 met with pt and his wife Bess [phone]. Pt is a retired  and remains active 
and independent. Wife transports as needed. Pt able to complete ADLS on his own, uses no DME 
or in home care services. No HH or HD services needed at this time. PCP is ALEX Harper and 
uses [facility] on [facility] for rx. Pt denies need for SNF, states he wants to return home at [place]. 

-------------------------------------------------------------------------------

Addendum: 02/18/25 at 1209 by BELKIS THAYER

-------------------------------------------------------------------------------

Amended: Links added.

## 2025-02-19 NOTE — CONS
CONSULT NOTE:


Patient is a 73-year-old male coming in to be evaluated for fever.  Per family 

member and patient he has been having fever for a couple of days.  He was 

evaluated at the urgent care and was told had an upper respiratory infection.  

Patient does has a history of cancer in his currently on chemotherapy.  Patient 

was to follow up at the emergency room per MD Gore for evaluation of a 

pneumonia.


Allergies:  


Coded Allergies:  


     No Known Allergies (Unverified  Allergy, Unknown, 9/24/22)


Home Meds


Active Scripts


Ibuprofen (Motrin/Advil) 800 Mg Tab, 800 MG PO TIDP PRN for PAIN, #30 TAB


   Prov:DEMAR CASSIDY MD         9/24/22


Lidocaine (Lidoderm Patch 5%) 1 Patch Patch, 1 PATCH TP DAILY PRN for PAIN, #10 

ADH.PATCH


   Prov:DEMAR CASSIDY MD         9/24/22


Cyclobenzaprine HCl (Flexeril) 10 Mg Tab, 10 MG PO TID, #30 TAB


   Prov:DEMAR CASSIDY MD         9/24/22


Reported Medications


Hydrochlorothiazide (Hydrochlorothiazide) 25 Mg Tablet, 25 MG PO DAILY, TAB


   9/24/22


Hydroxyzine HCl (Hydroxyzine HCl) 25 Mg Tablet, 25 MG PO DAILY, TAB


   9/24/22





Past History


Past Medical History


Past Medical History:  Cancer, Hepatitis


Medical History Other:  ESOPHAGEAL CA, METS


Past Surgical History:  Other


Surgical History Other:  HERNIA





Family History


Family History:  HTN





Social History


Social History:  ETOH, Lives with family





Review of Systems


ROS Dictation


CONSTITUTIONAL:   chills,  fever,  weakness, no diaphoresis, no malaise.


ALLERGIES:  


Coded Allergies:  


     No Known Allergies (Unverified  Allergy, Unknown, 9/24/22)


HOME MEDS:


Active Scripts


Ibuprofen (Motrin/Advil) 800 Mg Tab, 800 MG PO TIDP PRN for PAIN, #30 TAB


   Prov:DEMAR CASSIDY MD         9/24/22


Lidocaine (Lidoderm Patch 5%) 1 Patch Patch, 1 PATCH TP DAILY PRN for PAIN, #10 

ADH.PATCH


   Prov:DEMAR CASSIDY MD         9/24/22


Cyclobenzaprine HCl (Flexeril) 10 Mg Tab, 10 MG PO TID, #30 TAB


   Prov:DEMAR CASSIDY MD         9/24/22


Reported Medications


Hydrochlorothiazide (Hydrochlorothiazide) 25 Mg Tablet, 25 MG PO DAILY, TAB


   9/24/22


Hydroxyzine HCl (Hydroxyzine HCl) 25 Mg Tablet, 25 MG PO DAILY, TAB


   9/24/22








PHYSICAL EXAM


HEAD/FACE:  No signs of trauma. 


EENT:  No eye pain, no blurred vision, no tearing, no double vision, no ear 

pain, no ear discharge, no nose pain, no nasal congestion, no throat pain, no 

throat swelling, no mouth pain. 


RESPIRATORY:   cough, no orthopnea, no SOB, no stridor, no wheezing. 


CARDIOVASCULAR:  No chest pain, no edema, no palpitations, no syncope. 


GASTROINTESTINAL/ABDOMINAL:   No abdominal pain, no constipation, no diarrhea, 

no nausea, no vomiting. 


GENITOURINARY:  No abnormal discharge, no dysuria, no frequent urination, no 

hematuria. No complaints of pain in the genitals. 


MUSCULOSKELETAL:  No back pain, no gout, no joint pain, no joint swelling, no 

muscle pain, no muscle stiffness, no neck pain. 


INTEGUMENTARY:  No change in color, no change in hair/nails, no dryness, no l

esion, no lumps, no rash. 


NEUROLOGICAL/PSYCH:  No anxiety, not depressed, no emotional problem, no 

headache, no numbness, no pre-existing deficit, no history of seizures,  no 

tremors, no weakness. 


HEMATOLOGIC/LYMPHATIC:  Not anemic, no history of blood clots, no apparent 

bleeding, no bruising, glands not swollen.








Assessment





1.  History of esophageal cancer with the patient receiving chemotherapy 

treatment as a palliative with Dr. Teixeira in [facility].





2.  Chemo induced neutropenia





3.  Sepsis





4.  Failure to thrive





5.  Metastatic disease to the lung





Plan





1.  Patient to be under neutropenic precaution





2.  Continue antibiotic treatment





3.  Granix 300 mcg subcu daily





4.  CBC daily.





5.  Will hold any chemotherapy treatment for this patient at this time





6.  I did talk to the patient with family.  They know that he have metastatic 

disease.  Prognosis is poor with the patient is not curative.LAB RESULTS


2/19/25 04:40: 


White Blood Count 1.0*L, Red Blood Count 3.31L, Hemoglobin 10.0L, Hematocrit 

29.1L, Mean Corpuscular Volume 87.9, Mean Corpuscular Hemoglobin 30.2, Mean 

Corpuscular Hemoglobin Concent 34.4, Red Cell Distribution Width 13.9, Platelet 

Count 105L, Mean Platelet Volume 9.9, Immature Granulocyte % (Auto) 0.0, 

Neutrophils (%) (Auto) 2.8L, Lymphocytes (%) (Auto) 60.6H, Monocytes (%) (Auto) 

35.6H, Eosinophils (%) (Auto) 0.0, Basophils (%) (Auto) 1.0, Neutrophils # 

(Auto) 0.0L, Lymphocytes # (Auto) 0.6L, Monocytes # (Auto) 0.4, Eosinophils # 

(Auto) 0.00, Basophils # (Auto) 0.01, Absolute Immature Granulocyte (auto 0.00, 

Nucleated Red Blood Cells 0.0, Sodium Level 125L, Potassium Level 3.4L, Chloride

Level 93L, Carbon Dioxide Level 26, Blood Urea Nitrogen 5L, Creatinine 0.6, 

Glomerular Filtration Rate Calc 102, Random Glucose 97, Total Calcium 7.6L, 

Total Bilirubin 0.6, Aspartate Amino Transf (AST/SGOT) 22, Alanine 

Aminotransferase (ALT/SGPT) 25, Alkaline Phosphatase 66, C-Reactive Protein, 

Quantitative 29.50H, Total Protein 7.3, Albumin 2.5L, Thyroid Stimulating 

Hormone (TSH) 11.53H


2/18/25 06:59: White Cell Morphology Comment See comments





Laboratory Tests








Test


 2/19/25


04:40


 


White Blood Count


 1.0 K/uL


(4.8-10.8)  *L


 


Red Blood Count


 3.31 MIL/uL


(4.50-6.20)  L


 


Hemoglobin


 10.0 g/dL


(14.0-18.0)  L


 


Hematocrit


 29.1 % (42-54)


L


 


Mean Corpuscular Volume


 87.9 fL


(79-99)


 


Mean Corpuscular Hemoglobin


 30.2 pg


(27.0-33.0)


 


Mean Corpuscular Hemoglobin


Concent 34.4 g/dL


(32.0-36.0)


 


Red Cell Distribution Width


 13.9 %


(11.0-15.5)


 


Platelet Count


 105 K/uL


(130-400)  L


 


Mean Platelet Volume


 9.9 fL


(7.5-10.5)


 


Immature Granulocyte % (Auto) 0.0 % (0-1)  


 


Neutrophils (%) (Auto)


 2.8 %


(40.0-77.0)  L


 


Lymphocytes (%) (Auto)


 60.6 %


(21.0-51.0)  H


 


Monocytes (%) (Auto)


 35.6 %


(3.0-13.0)  H


 


Eosinophils (%) (Auto)


 0.0 %


(0.0-8.0)


 


Basophils (%) (Auto)


 1.0 %


(0.0-5.0)


 


Neutrophils # (Auto)


 0.0 K/uL


(1.8-7.7)  L


 


Lymphocytes # (Auto)


 0.6 K/uL


(1.0-4.8)  L


 


Monocytes # (Auto)


 0.4 K/uL


(0.1-1.0)


 


Eosinophils # (Auto)


 0.00 K/uL


(0.00-0.70)


 


Basophils # (Auto)


 0.01 K/uL


(0.00-0.20)


 


Absolute Immature Granulocyte


(auto 0.00 K/uL


(0-1)


 


Nucleated Red Blood Cells


 0.0 %


(0.0-0.19)


 


Sodium Level


 125 mmol/L


(136-145)  L


 


Potassium Level


 3.4 mmol/L


(3.5-5.1)  L


 


Chloride Level


 93 mmol/L


(101-111)  L


 


Carbon Dioxide Level


 26 mmol/L


(21-32)


 


Blood Urea Nitrogen


 5 mg/dL (7-18)


L


 


Creatinine


 0.6 mg/dL


(0.5-1.3)


 


Glomerular Filtration Rate


Calc 102 mL/min


(>90)


 


Random Glucose


 97 mg/dL


()


 


Total Calcium


 7.6 mg/dL


(8.5-10.1)  L


 


Total Bilirubin


 0.6 mg/dL


(0.2-1.0)


 


Aspartate Amino Transf


(AST/SGOT) 22 U/L (10-37)





 


Alanine Aminotransferase


(ALT/SGPT) 25 U/L (12-78)





 


Alkaline Phosphatase


 66 U/L


()


 


C-Reactive Protein,


Quantitative 29.50 mg/L


(0.5-3.0)  H


 


Total Protein


 7.3 g/dL


(6.0-8.3)


 


Albumin


 2.5 g/dL


(3.5-5.0)  L


 


Thyroid Stimulating Hormone


(TSH) 11.53 uIU/mL


(0.36-3.74)  H

















SHADI MENDEZ MD              Feb 19, 2025 17:02

## 2025-02-19 NOTE — PN
PROGRESS NOTE


PROGRESS NOTE


DATE OF PROGRESS NOTE:


2/19/25


SUBJECTIVE:


Patient voices no complaints


VITAL SIGNS





Vital Signs








  Date Time  Temp Pulse Resp B/P (MAP) Pulse Ox O2 Delivery O2 Flow Rate FiO2


 


2/19/25 19:50 98.8 99 17 114/71 99 Room Air  


 


2/19/25 19:16        21


 


2/19/25 08:00       0 








PHYSICAL EXAM:


HEAD/FACE:  No signs of trauma. 


EENT:  No eye pain, no blurred vision, no tearing, no double vision, no ear 

pain, no ear discharge, no nose pain, no nasal congestion, no throat pain, no 

throat swelling, no mouth pain. 


RESPIRATORY:   cough, no orthopnea, no SOB, no stridor, no wheezing. 


CARDIOVASCULAR:  No chest pain, no edema, no palpitations, no syncope. 


GASTROINTESTINAL/ABDOMINAL:   No abdominal pain, no constipation, no diarrhea, 

no nausea, no vomiting. 


GENITOURINARY:  No abnormal discharge, no dysuria, no frequent urination, no 

hematuria. No complaints of pain in the genitals. 


MUSCULOSKELETAL:  No back pain, no gout, no joint pain, no joint swelling, no 

muscle pain, no muscle stiffness, no neck pain. 


INTEGUMENTARY:  No change in color, no change in hair/nails, no dryness, no 

lesion, no lumps, no rash. 


NEUROLOGICAL/PSYCH:  No anxiety, not depressed, no emotional problem, no 

headache, no numbness, no pre-existing deficit, no history of seizures,  no 

tremors, no weakness. 


HEMATOLOGIC/LYMPHATIC:  Not anemic, no history of blood clots, no apparent 

bleeding, no bruising, glands not swollen.


All Systems Negative, Except as Noted.


LABORATORY:





                              Laboratory Result(s)








Test


 2/19/25


04:40


 


White Blood Count


 1.0 K/uL


(4.8-10.8)


 


Red Blood Count


 3.31 MIL/uL


(4.50-6.20)


 


Hemoglobin


 10.0 g/dL


(14.0-18.0)


 


Hematocrit 29.1 % (42-54) 


 


Mean Corpuscular Volume


 87.9 fL


(79-99)


 


Mean Corpuscular Hemoglobin


 30.2 pg


(27.0-33.0)


 


Mean Corpuscular Hemoglobin


Concent 34.4 g/dL


(32.0-36.0)


 


Red Cell Distribution Width


 13.9 %


(11.0-15.5)


 


Platelet Count


 105 K/uL


(130-400)


 


Mean Platelet Volume


 9.9 fL


(7.5-10.5)


 


Immature Granulocyte % (Auto) 0.0 % (0-1) 


 


Neutrophils (%) (Auto)


 2.8 %


(40.0-77.0)


 


Lymphocytes (%) (Auto)


 60.6 %


(21.0-51.0)


 


Monocytes (%) (Auto)


 35.6 %


(3.0-13.0)


 


Eosinophils (%) (Auto)


 0.0 %


(0.0-8.0)


 


Basophils (%) (Auto)


 1.0 %


(0.0-5.0)


 


Neutrophils # (Auto)


 0.0 K/uL


(1.8-7.7)


 


Lymphocytes # (Auto)


 0.6 K/uL


(1.0-4.8)


 


Monocytes # (Auto)


 0.4 K/uL


(0.1-1.0)


 


Eosinophils # (Auto)


 0.00 K/uL


(0.00-0.70)


 


Basophils # (Auto)


 0.01 K/uL


(0.00-0.20)


 


Absolute Immature Granulocyte


(auto 0.00 K/uL


(0-1)


 


Nucleated Red Blood Cells


 0.0 %


(0.0-0.19)


 


Sodium Level


 125 mmol/L


(136-145)


 


Potassium Level


 3.4 mmol/L


(3.5-5.1)


 


Chloride Level


 93 mmol/L


(101-111)


 


Carbon Dioxide Level


 26 mmol/L


(21-32)


 


Blood Urea Nitrogen 5 mg/dL (7-18) 


 


Creatinine


 0.6 mg/dL


(0.5-1.3)


 


Glomerular Filtration Rate


Calc 102 mL/min


(>90)


 


Random Glucose


 97 mg/dL


()


 


Total Calcium


 7.6 mg/dL


(8.5-10.1)


 


Total Bilirubin


 0.6 mg/dL


(0.2-1.0)


 


Aspartate Amino Transf


(AST/SGOT) 22 U/L (10-37) 





 


Alanine Aminotransferase


(ALT/SGPT) 25 U/L (12-78) 





 


Alkaline Phosphatase


 66 U/L


()


 


C-Reactive Protein,


Quantitative 29.50 mg/L


(0.5-3.0)


 


Total Protein


 7.3 g/dL


(6.0-8.3)


 


Albumin


 2.5 g/dL


(3.5-5.0)


 


Thyroid Stimulating Hormone


(TSH) 11.53 uIU/mL


(0.36-3.74)








Microbiology Results








 Date/Time


Source Procedure


Growth Status





 


 2/19/25 06:55


Tracheal Aspirate Aspirate Gram Stain - Final


 Resulted


 


 2/19/25 06:55


Tracheal Aspirate Aspirate Respiratory Culture


Pending Resulted








INPATIENT MEDS:





Current Medications








 Medications  Dose


 Ordered  Sig/Lobo  Start Time


 Stop Time Status Last Admin


 


 Albuterol  1 udvial  [id number]  2/17/25 18:00


 3/19/25 17:59  2/19/25 10:59





 


 Albuterol  1 udvial  Q4HPRN  PRN  2/17/25 17:00


 3/19/25 16:59   





 


 Azithromycin  250 ml @ 


 250 mls/hr  Q24H  2/17/25 17:00


 2/22/25 16:59  2/19/25 17:32





 


 Enoxaparin Sodium  40 mg  DAILY  2/18/25 09:00


 3/20/25 08:59  2/18/25 09:54





 


 Hydromorphone HCl  0.5 mg  Q3H3  PRN  2/17/25 22:00


 2/22/25 21:59  2/19/25 18:48





 


 Acetaminophen  650 mg  Q6H  PRN  2/17/25 22:00


 3/19/25 21:59  2/18/25 16:27





 


 Acetaminophen  650 mg  Q6H  PRN  2/17/25 22:00


 3/19/25 21:59   





 


 Ondansetron HCl  4 mg  Q6H  PRN  2/17/25 22:00


 3/19/25 21:59   





 


 Cefepime HCl  2 gm  Q12H  2/18/25 14:00


 2/28/25 13:59  2/19/25 14:11





 


 Fluconazole/


 Sodium Chloride  400 mg  Q24H  2/18/25 14:00


 2/28/25 13:59  2/19/25 14:11





 


 Clotrimazole  10 mg  Q6H  2/19/25 21:00


 3/21/25 20:59  2/19/25 20:38





 


 Potassium Chloride  100 ml @ 


 100 mls/hr  AD  PRN  2/19/25 18:30


 3/21/25 18:29   





 


 Potassium Chloride  20 meq  AD  PRN  2/19/25 18:30


 3/21/25 18:29   





 


 Potassium Chloride  20 meq  AD  PRN  2/19/25 18:30


 3/21/25 18:29  2/19/25 20:38





 


 Al Hydroxide/Mg


 Hydroxide/


 Lidocaine HCl/


 Diphenhydramine


 HCl  TAKE


 (5-10)ML


 SWISH ...  Q6H  PRN  2/19/25 19:00


 3/21/25 18:59   











PROBLEM LIST:  


(1) Pneumonia


ICD Code:  J18.9 - Pneumonia, unspecified organism


(2) Sepsis


ICD Code:  A41.9 - Sepsis, unspecified organism


(3) Immunodeficiency


ICD Code:  D84.9 - Immunodeficiency, unspecified


(4) Maintenance chemotherapy


ICD Code:  Z51.11 - Encounter for antineoplastic chemotherapy


PLAN:


IV antibiotics


 hydrate


 consult oncologyAnd pulmonology


Sepsis


Neutropenic fever


Severe neutropenia, POA


Esophageal CA with Mets


Immunodeficiency


Maintenance chemotherapy 


suspected community acquired pneumonia POA


History of hepatitis 


Hypertension 


Chronic Generalized rash to face and scalp worsening, POA











IZZY REAGAN MD              Feb 19, 2025 20:49

## 2025-02-19 NOTE — NUR
Nutritional Note:



Visited pt with wife at bedside. Pt reported not having nausea or vomiting, difficulty 
swallowing, and no food allergies. Pt reported lactose intolerance. Pt reported last PCP 
visit was 3 weeks ago and was currently on chemotherapy. Pt reported altered taste buds due 
to recent change of chemotherapy a week ago, so PO intake has significantly decreased to 
<10%. Pt stated he has appetite, but all food tastes different and not pleasant. Pt reported 
breakfast tray PO <10% due to poor taste. Pt reported UBW of 190 lbs and recently lost 3 lbs 
in one week since starting new chemo. Pt reported last BM 2/18/25 was loose and experienced 
diarrhea for the past 3 days. Pt reported concern with feeling constipated due to no BM in 
the morning. Verbal nutrition education was provided on cancer effect in weight loss and 
need to maintain weight by eating regularly. Pt was agreeable to a chilled Ensure Max 
supplement in vanilla and Prostat Jello to increase oral intake. Pt is high risk for PCM due 
to poor oral intake and recent weight loss of 3 lbs in a week. Pt adherence expectance is 
good.    



Nutritional concerns: <25% PO intake, predicted weight loss, loose bowel movement.



Recommendations:

- Continue Regular Diet 

- Order chilled Ensure Max supplement qd TID all trays 

- Order Prostat Jello 30 ml TID all trays 

-Consider MVI QD

-Monitor PO intake

-Encourage PO intake as able

-If poor PO intake continues consider appetite stimulant

-Monitor BM

-Monitor electrolytes

-Replenish electrolytes per protocol

-Monitor wts

-Reweigh as able

-Monitor care goals

RD to follow + available for consult per protocol



Signed by Dietetic Student Alexia Bonilla

-------------------------------------------------------------------------------

Addendum: 02/19/25 at 1059 by ADEOLA RUBY RD

-------------------------------------------------------------------------------

Amended: Links added.

## 2025-02-19 NOTE — PN
[facility] PROGRESS NOTE 





Date Patient Seen: Feb 19, 2025 


Time of Visit: 13:18


Supervising Physician: Dr. Nabil Love





Primary Care Physician: Wojciech Harper MD 


Outpatient Specialists: 


Inpatient Consults: Jaquan Bills MD





PROBLEM LIST:


Sepsis


Neutropenic fever


Severe neutropenia, POA


Esophageal CA with Mets


Immunodeficiency


Maintenance chemotherapy 


suspected community acquired pneumonia POA


History of hepatitis 


Hypertension 


Chronic Generalized rash to face and scalp worsening, POA





INTERVAL HISTORY:


Patient evaluated at bedside with his wife present.  Patient was resting 

comfortably at this time, he is currently on room air.  Patient's chest x-ray 

and CT scan both show no obvious infiltrates, no acute pulmonary process.  

Patient does have pulmonary nodules, likely metastases secondary to his esop

hageal cancer.  Patient was on neutropenic precautions at this time, white count

of 1.0 today, hemoglobin of 10.0, platelets of 105.  Patient has pending 

hematology consultation with Dr. Story.  Patient denies any chest pain, no 

shortness of breath, denies any nausea or vomiting.  Patient is tolerating his 

diet well.  We will continue to follow the patient while on the med for, pending

recommendations from Hematology at this time.





REVIEW OF SYSTEMS: 


12 point ROS reviewed with patient. Pertinent positives mentioned above. 

Otherwise negative.





PHYSICAL EXAM: 


GENERAL: alert, weak, awake oriented x 3


HEENT: EOMI, Sclera non icteric, moist mucosa , generalized rash to face and 

scalp


NECK: Supple, no JVD, trachea midline 


LUNGS: diminished  breath sounds bilaterally. No wheezes


HEART: Regular rate and rhythm. Normal S1 and S2, without murmurs 


ABD: Abdomen soft, nontender. Bowel sounds present


EXT: No clubbing cyanosis or edema


NEURO: Alert and oriented to person, follows commands





                             Vital Signs (last 8hr)








  Date Time  Temp Pulse Resp B/P (MAP) Pulse Ox O2 Delivery O2 Flow Rate FiO2


 


2/19/25 11:07 98.1 96 16 137/65 96 Room Air  


 


2/19/25 10:59  88 18     


 


2/19/25 08:12 98.4 97 20 135/73 96 Room Air  


 


2/19/25 06:32  100 18   N/A Room Air  21


 


2/19/25 06:30  100 18     











LABS:





                                Hematology Labs:








Test


 2/19/25


04:40 2/18/25


06:59 Range/Units


 


 


White Blood Count 1.0 *L  4.8-10.8  K/uL


 


Red Blood Count


 3.31 L


 


 4.50-6.20


MIL/uL


 


Hemoglobin 10.0 L  14.0-18.0  g/dL


 


Hematocrit 29.1 L  42-54  %


 


Mean Corpuscular Volume 87.9   79-99  fL


 


Mean Corpuscular Hemoglobin 30.2   27.0-33.0  pg


 


Mean Corpuscular Hemoglobin


Concent 34.4 


 


 32.0-36.0  g/dL





 


Red Cell Distribution Width 13.9   11.0-15.5  %


 


Platelet Count 105 L  130-400  K/uL


 


Mean Platelet Volume 9.9   7.5-10.5  fL


 


Immature Granulocyte % (Auto) 0.0   0-1  %


 


Neutrophils (%) (Auto) 2.8 L  40.0-77.0  %


 


Lymphocytes (%) (Auto) 60.6 H  21.0-51.0  %


 


Monocytes (%) (Auto) 35.6 H  3.0-13.0  %


 


Eosinophils (%) (Auto) 0.0   0.0-8.0  %


 


Basophils (%) (Auto) 1.0   0.0-5.0  %


 


Neutrophils # (Auto) 0.0 L  1.8-7.7  K/uL


 


Lymphocytes # (Auto) 0.6 L  1.0-4.8  K/uL


 


Monocytes # (Auto) 0.4   0.1-1.0  K/uL


 


Eosinophils # (Auto) 0.00   0.00-0.70  K/uL


 


Basophils # (Auto) 0.01   0.00-0.20  K/uL


 


Absolute Immature Granulocyte


(auto 0.00 


 


 0-1  K/uL





 


Nucleated Red Blood Cells 0.0   0.0-0.19  %


 


White Cell Morphology Comment  See comments   








                                 Chemistry Labs:








Test


 2/19/25


04:40 Range/Units


 


 


Sodium Level 125 L 136-145  mmol/L


 


Potassium Level 3.4 L 3.5-5.1  mmol/L


 


Chloride Level 93 L 101-111  mmol/L


 


Carbon Dioxide Level 26  21-32  mmol/L


 


Blood Urea Nitrogen 5 L 7-18  mg/dL


 


Creatinine 0.6  0.5-1.3  mg/dL


 


Glomerular Filtration Rate


Calc 102 


 >90  mL/min





 


Random Glucose 97    mg/dL


 


Total Calcium 7.6 L 8.5-10.1  mg/dL


 


Total Bilirubin 0.6  0.2-1.0  mg/dL


 


Aspartate Amino Transf


(AST/SGOT) 22 


 10-37  U/L





 


Alanine Aminotransferase


(ALT/SGPT) 25 


 12-78  U/L





 


Alkaline Phosphatase 66    U/L


 


C-Reactive Protein,


Quantitative 29.50 H


 0.5-3.0  mg/L





 


Total Protein 7.3  6.0-8.3  g/dL


 


Albumin 2.5 L 3.5-5.0  g/dL


 


Thyroid Stimulating Hormone


(TSH) 11.53 H


 0.36-3.74


uIU/mL











DIAGNOSTICS / RADIOLOGY RESULTS:


[ ]





PLAN 


Follow oncology recommendations 


Broad spectrum antibiotics


follow ID recs 


panculture 


Negative for thyphoid, Influenza A and B, covid and strep


CT of the chest 


CBC


CMP


TSH





NEURO: 


Minimize central acting medications as possible. 


Maintain fall precautions, adequate lighting during the day





PULMONARY: 


Supplemental 02 as needed. 


Maintain aspiration precautions at all times





CARDIOVASCULAR: 


Follow hemodynamics. 


Vital signs per facility protocol





GI & NUTRITION:


Continue with nutritional support.


Continue stool softeners and laxatives as needed.





KIDNEYS & ELECTROLYTES: 


Strict monitoring of intake, output and overall fluid balance. 


Avoid nephrotoxic medications to the extent possible. 


Medications to be dosed according to renal function.


Monitor electrolytes and replace as needed





ENDOCRINE:


Maintain blood glucose between 100-180 at all times.


Hypoglycemia protocol in place





INFECTIOUS DISEASE: 


Trend temperature, WBC and procalcitonin level


Follow cultures, deescalate antibiotics as soon as possible.


Panculture if new onset fever





ONCOLOGY/HEMATOLOGY/COAGULATION: 


Monitor for s/s of bleeding


Monitor hemoglobin, coagulation studies as needed





SKIN:


Pressure ulcer prevention per facility protocol


Specialty mattress





ORTHO/REHAB:


Continue PT/OT 





Prophylaxis:


Continue GI and DVT prophylaxis





Code Status: 


Full Resuscitation





Disposition:


TBD





Other:


Total patient care time exceeds 35 minutes excluding all procedures.











ROSARIO OLIVIER             Feb 19, 2025 13:19


NABIL LOVE MD             Feb 22, 2025 14:46

## 2025-02-20 NOTE — PN
[facility] PROGRESS NOTE 





Date Patient Seen: Feb 20, 2025 


Time of Visit: 14:34


Supervising Physician: Dr. Vijaya Love





Primary Care Physician: Wojciech Harper MD 


Outpatient Specialists: 


Inpatient Consults: Jaquan Bills MD





PROBLEM LIST:


Sepsis


Neutropenic fever


Severe neutropenia, POA


Esophageal CA with Mets


Immunodeficiency


Maintenance chemotherapy 


suspected community acquired pneumonia POA


History of hepatitis 


Hypertension 


Chronic Generalized rash to face and scalp worsening, POA





INTERVAL HISTORY:


Patient was seen at bedside with his wife present.  He remains on neutropenic 

precautions currently on Diflucan cefepime and azithromycin.  Patient was seen 

by Oncology yesterday, received his 1st dose of Granix 300 today.  Patient was 

on room air, chemo was being held.  Tracheal aspirate culture positive for Gram-

negative rods and Gram-positive rods at this time.  We will continue to follow 

this patient closely while on the floor.  Disposition per primary and Oncology.





REVIEW OF SYSTEMS: 


12 point ROS reviewed with patient. Pertinent positives mentioned above. 

Otherwise negative.





PHYSICAL EXAM: 


GENERAL: alert, weak, awake oriented x 3


HEENT: EOMI, Sclera non icteric, moist mucosa , generalized rash to face and 

scalp


NECK: Supple, no JVD, trachea midline 


LUNGS: diminished  breath sounds bilaterally. No wheezes


HEART: Regular rate and rhythm. Normal S1 and S2, without murmurs 


ABD: Abdomen soft, nontender. Bowel sounds present


EXT: No clubbing cyanosis or edema


NEURO: Alert and oriented to person, follows commands





                             Vital Signs (last 8hr)








  Date Time  Temp Pulse Resp B/P (MAP) Pulse Ox O2 Delivery O2 Flow Rate FiO2


 


2/20/25 11:53 99.0 93 20 128/69 98 Room Air  


 


2/20/25 11:07  94 19     


 


2/20/25 11:06  94 19   N/A Room Air  21


 


2/20/25 07:48 98.8 91 16 138/66 99 Room Air 0.0 


 


2/20/25 06:43  82 19   N/A Room Air  21 2/20/25 06:43  82 19     











LABS:





                                Hematology Labs:








Test


 2/20/25


03:44 Range/Units


 


 


White Blood Count 0.8 *L 4.8-10.8  K/uL


 


Red Blood Count


 3.13 L


 4.50-6.20


MIL/uL


 


Hemoglobin 9.4 L 14.0-18.0  g/dL


 


Hematocrit 27.6 L 42-54  %


 


Mean Corpuscular Volume 88.2  79-99  fL


 


Mean Corpuscular Hemoglobin 30.0  27.0-33.0  pg


 


Mean Corpuscular Hemoglobin


Concent 34.1 


 32.0-36.0  g/dL





 


Red Cell Distribution Width 14.0  11.0-15.5  %


 


Platelet Count 115 L 130-400  K/uL


 


Mean Platelet Volume 10.0  7.5-10.5  fL


 


Immature Granulocyte % (Auto) 0.0  0-1  %


 


Neutrophils (%) (Auto) 3.6 L 40.0-77.0  %


 


Lymphocytes (%) (Auto) 44.6  21.0-51.0  %


 


Monocytes (%) (Auto) 50.6 H 3.0-13.0  %


 


Eosinophils (%) (Auto) 0.0  0.0-8.0  %


 


Basophils (%) (Auto) 1.2  0.0-5.0  %


 


Neutrophils # (Auto) 0.0 L 1.8-7.7  K/uL


 


Lymphocytes # (Auto) 0.4 L 1.0-4.8  K/uL


 


Monocytes # (Auto) 0.4  0.1-1.0  K/uL


 


Eosinophils # (Auto) 0.00  0.00-0.70  K/uL


 


Basophils # (Auto) 0.01  0.00-0.20  K/uL


 


Absolute Immature Granulocyte


(auto 0.00 


 0-1  K/uL





 


Nucleated Red Blood Cells 0.0  0.0-0.19  %








                                 Chemistry Labs:








Test


 2/20/25


03:44 2/19/25


04:40 Range/Units


 


 


Sodium Level 127 L  136-145  mmol/L


 


Potassium Level 4.0   3.5-5.1  mmol/L


 


Chloride Level 96 L  101-111  mmol/L


 


Carbon Dioxide Level 24   21-32  mmol/L


 


Blood Urea Nitrogen 5 L  7-18  mg/dL


 


Creatinine 0.6   0.5-1.3  mg/dL


 


Glomerular Filtration Rate


Calc 102 


 


 >90  mL/min





 


Random Glucose 94     mg/dL


 


Total Calcium 7.9 L  8.5-10.1  mg/dL


 


Total Bilirubin 0.6   0.2-1.0  mg/dL


 


Aspartate Amino Transf


(AST/SGOT) 20 


 


 10-37  U/L





 


Alanine Aminotransferase


(ALT/SGPT) 23 


 


 12-78  U/L





 


Alkaline Phosphatase 66     U/L


 


Total Protein 7.1   6.0-8.3  g/dL


 


Albumin 2.3 L  3.5-5.0  g/dL


 


C-Reactive Protein,


Quantitative 


 29.50 H


 0.5-3.0  mg/L





 


Thyroid Stimulating Hormone


(TSH) 


 11.53 H


 0.36-3.74


uIU/mL











DIAGNOSTICS / RADIOLOGY RESULTS:


[ ]





PLAN 


Follow oncology recommendations 


Broad spectrum antibiotics


follow ID recs 


panculture 


Negative for thyphoid, Influenza A and B, covid and strep


CT of the chest 


CBC


CMP


TSH





NEURO: 


Minimize central acting medications as possible. 


Maintain fall precautions, adequate lighting during the day





PULMONARY: 


Supplemental 02 as needed. 


Maintain aspiration precautions at all times





CARDIOVASCULAR: 


Follow hemodynamics. 


Vital signs per facility protocol





GI & NUTRITION:


Continue with nutritional support.


Continue stool softeners and laxatives as needed.





KIDNEYS & ELECTROLYTES: 


Strict monitoring of intake, output and overall fluid balance. 


Avoid nephrotoxic medications to the extent possible. 


Medications to be dosed according to renal function.


Monitor electrolytes and replace as needed





ENDOCRINE:


Maintain blood glucose between 100-180 at all times.


Hypoglycemia protocol in place





INFECTIOUS DISEASE: 


Trend temperature, WBC and procalcitonin level


Follow cultures, deescalate antibiotics as soon as possible.


Panculture if new onset fever





ONCOLOGY/HEMATOLOGY/COAGULATION: 


Monitor for s/s of bleeding


Monitor hemoglobin, coagulation studies as needed





SKIN:


Pressure ulcer prevention per facility protocol


Specialty mattress





ORTHO/REHAB:


Continue PT/OT 





Prophylaxis:


Continue GI and DVT prophylaxis





Code Status: 


Full Resuscitation





Disposition:


TBD





Other:


Total patient care time exceeds 35 minutes excluding all procedures.











ROSARIO OLIVIER             Feb 20, 2025 14:34

## 2025-02-20 NOTE — PN
INFECTIOUS DISEASE  PROGRESS NOTE








Date of Service: 2025





SUBJECTIVE:


This is a 73-year-old male patient who presented to the hospital for evaluation 

of fever and sore throat.  On admission to the hospital patient had a fever of 

101.1.  Patient's WBC was 0.4, patient reported his last chemotherapy was 2 

weeks ago.  On examination patient has oral mucositis and has been started on 

Mycelex and magic mouthwash.


Today patient's WBC is 0.8.  Oncologist has evaluated patient and will be 

started on Granix.  Platelet count has improved to 115 today.  We will follow up

on the sputum culture results.  No fever reported this morning, temperature is 

98.8.  Patient is currently on cefepime, fluconazole and azithromycin.  No 

reports of nausea or vomiting.  We will continue to monitor patient's care.








PHYSICAL EXAM


EYES:  Anicteric.  Pupils equal and reactive.


HENT: No oral thrush seen, moist,  Oral mucositis.


NECK:  Supple, no JVD or thyromegaly.


LUNGS:  Good air entry.  No rales, no rhonchi.  Crackles.


CARDIOVASCULAR:  S1, S2 regular.  No murmur heard.


ABDOMEN:  Soft, non tender, bowel sounds present, no organomegaly


CENTRAL NERVOUS SYSTEM:  Awake, alert, oriented x 3.  


SKIN:  No rashes, no swelling.


LYMPHATICS: No peripheral lymphadenopathy


MUSCULOSKELETAL:  No joint swelling, erythema or tenderness.


EXTREMITIES:  No cyanosis or clubbing.


BACK:  No deformity, no pressure ulcer.


GENITOURINARY:  No dysuria or hematuria. 








Vital Sign (Last 12 Hours)








 25





 23:28 23:39 03:11 06:43


 


Temp  98.8 98.1 


 


Pulse 85 91 93 82


 


Resp 19 17 16 19


 


B/P (MAP)  136/73 136/65 


 


Pulse Ox  98 99 


 


O2 Delivery  Room Air Room Air 


 


    





 25  





 06:43 07:48  


 


Temp  98.8  


 


Pulse 82 91  


 


Resp 19 16  


 


B/P (MAP)  138/66  


 


Pulse Ox  99  


 


O2 Delivery N/A Room Air Room Air  


 


O2 Flow Rate  0.0  


 


FiO2 21   














Intake & Output (last 24hrs)   


 


 25





 15:00 23:00 07:00


 


Intake Total  1250 ml 300.0 ml


 


Output Total  200 ml 500 ml


 


Balance  1050 ml -200.0 ml











LABS:








                                   Laboratory:








Test


 25


03:44 25


04:40 Range/Units


 


 


White Blood Count 0.8 *L  4.8-10.8  K/uL


 


Red Blood Count


 3.13 L


 


 4.50-6.20


MIL/uL


 


Hemoglobin 9.4 L  14.0-18.0  g/dL


 


Hematocrit 27.6 L  42-54  %


 


Mean Corpuscular Volume 88.2   79-99  fL


 


Mean Corpuscular Hemoglobin 30.0   27.0-33.0  pg


 


Mean Corpuscular Hemoglobin


Concent 34.1 


 


 32.0-36.0  g/dL





 


Red Cell Distribution Width 14.0   11.0-15.5  %


 


Platelet Count 115 L  130-400  K/uL


 


Mean Platelet Volume 10.0   7.5-10.5  fL


 


Immature Granulocyte % (Auto) 0.0   0-1  %


 


Neutrophils (%) (Auto) 3.6 L  40.0-77.0  %


 


Lymphocytes (%) (Auto) 44.6   21.0-51.0  %


 


Monocytes (%) (Auto) 50.6 H  3.0-13.0  %


 


Eosinophils (%) (Auto) 0.0   0.0-8.0  %


 


Basophils (%) (Auto) 1.2   0.0-5.0  %


 


Neutrophils # (Auto) 0.0 L  1.8-7.7  K/uL


 


Lymphocytes # (Auto) 0.4 L  1.0-4.8  K/uL


 


Monocytes # (Auto) 0.4   0.1-1.0  K/uL


 


Eosinophils # (Auto) 0.00   0.00-0.70  K/uL


 


Basophils # (Auto) 0.01   0.00-0.20  K/uL


 


Absolute Immature Granulocyte


(auto 0.00 


 


 0-1  K/uL





 


Nucleated Red Blood Cells 0.0   0.0-0.19  %


 


Sodium Level 127 L  136-145  mmol/L


 


Potassium Level 4.0   3.5-5.1  mmol/L


 


Chloride Level 96 L  101-111  mmol/L


 


Carbon Dioxide Level 24   21-32  mmol/L


 


Blood Urea Nitrogen 5 L  7-18  mg/dL


 


Creatinine 0.6   0.5-1.3  mg/dL


 


Glomerular Filtration Rate


Calc 102 


 


 >90  mL/min





 


Random Glucose 94     mg/dL


 


Total Calcium 7.9 L  8.5-10.1  mg/dL


 


Total Bilirubin 0.6   0.2-1.0  mg/dL


 


Aspartate Amino Transf


(AST/SGOT) 20 


 


 10-37  U/L





 


Alanine Aminotransferase


(ALT/SGPT) 23 


 


 12-78  U/L





 


Alkaline Phosphatase 66     U/L


 


Total Protein 7.1   6.0-8.3  g/dL


 


Albumin 2.3 L  3.5-5.0  g/dL


 


C-Reactive Protein,


Quantitative 


 29.50 H


 0.5-3.0  mg/L





 


Thyroid Stimulating Hormone


(TSH) 


 11.53 H


 0.36-3.74


uIU/mL











DIAGNOSTICS / RADIOLOGY:








PATIENT: NIKOLAY SIDHU                            MR#: [id number]


ACCOUNT#: [id number]                              : 1951


SEX: M AGE: 73                                      LOCATION: [facility]


ORDER DT: 25 1241                             STATUS: [facility]


ACCESSION#: [id number]                            REPORT#: [id number]


SERVICE DT: 25 1239





REASON: fever


ORDERING PHYSICIAN: MARIE DOZIER MD


PROCEDURE: CXR1VW  -  CHEST 1VW





CHEST 1VW





REASON: fever





COMPARISON: None.





FINDINGS:  


There is 2 cm well-circumscribed mass midportion of the right lung.


There is some faint nodularity in the left lung which could be some


smaller nodules. There are no acute appearing infiltrates. Heart size is


normal. There is a chest port in place. Be some and bony thorax appear


unremarkable.





IMPRESSION:





1. 2 cm nodule right midlung, there may be some smaller nodules present


in the left lung.


2. No evidence of an acute infiltrate.








DICTATED BY: EMMETT ARTHUR MD


DATE: 25 2346





ASSESSMENT:


Healthcare associated pneumonia.  


Sepsis.  


Chemotherapy induced neutropenia. 


Esophageal cancer.  


Underlying immunosuppression.  


Oral Mucositis. 


Hyponatremia.








PLAN:


Continue fluconazole.  


Continue cefepime.  


Continue azithromycin.  


Continue Mycelex.  


Continue magic mouthwash.


We will follow up on the culture results.  


Oncologist has evaluated patient and will be started on Granix today.





This case was reviewed and discussed with my supervising physician and the above

assessment and plan was formulated and agreed upon.


ATTESTATION BY PHYSICIAN


I have seen and examined the patient. I reviewed the documentation, medical 

decision making, and treatment plan as noted by the mid-level provider above. I 

agree with the findings and plan of care.











TOO COFFEY MD, MIRTA L [facility]             2025 10:01

## 2025-02-20 NOTE — CONS
INFECTIOUS DISEASE CONSULTATION



DATE OF SERVICE:  02/19/2025



REQUESTING PHYSICIAN:  Dang Peralta NP



REASON FOR CONSULTATION:  Sepsis and neutropenia.



HISTORY OF PRESENT ILLNESS:  A 73-year-old male with history of esophageal 

cancer, hypertension, obesity, presented to the hospital with fever, cough.  No 

sore throat.  The patient's T-max was 101.  The patient has esophageal cancer 

and last chemotherapy was 1 week ago.  The patient received chemotherapy at [facility].  The patient was found with pancytopenia.  The patient has some cough.

 Chest x-ray shows infiltrates.



PAST MEDICAL HISTORY:

   * Esophageal cancer.

   * Hypertension.

   * Obesity.



PAST SURGICAL HISTORY:

   * _____.

   * EGD.

   * PEG placement.



ALLERGIES:  No known drug allergy.



CURRENT MEDICATIONS:  Include:

   * Cefepime.

   * _____.

   * _____.

   * DuoNeb.

   * Lisinopril.



SOCIAL HISTORY:  Lives with wife.  No alcohol, tobacco or illicit drug use.



FAMILY HISTORY:  Noncontributory.



REVIEW OF SYSTEMS:  Greater than 10 systems were reviewed, negative except as 

documented above.



PHYSICAL EXAMINATION:

GENERAL:  Elderly male, awake.

VITAL SIGNS:  Temperature 98.1, pulse 96, respirations 16, /64.

EYES:  No icterus.  Pupils equal and reactive.

HENT:  There is extensive oral mucositis.  No oral thrush seen.

NECK:  Supple, no JVD or thyromegaly.

LUNGS:  Good air entry.  No rales, no rhonchi.

CARDIOVASCULAR:  S1, S2 regular, tachycardic.  No murmur heard.

ABDOMEN:  Obese, soft.  Bowel sound is present.

CENTRAL NERVOUS SYSTEM:  Awake, alert, oriented x 3.  No focal deficits.

SKIN:  No rashes, no itchiness.

LYMPHATIC:  No peripheral lymphadenopathy.

BACK:  No deformity, no pressure ulcer.  _____.

MUSCULOSKELETAL:  No joint swelling, erythema or tenderness.



LABORATORY DATA:  Sodium 125, potassium 3.4, BUN 5, creatinine 0.6.  WBC at 1.0,

hemoglobin 10.0, platelet 105.  Urinalysis:  Wbc 5. Blood culture, no growth for

2 days.



RADIOLOGY:  CT chest shows infiltrates.



ASSESSMENT:  A 73-year-old male presenting with fever.



CURRENT PROBLEMS:  Include:

   * Sepsis.

   * Healthcare-associated pneumonia.

   * Hyponatremia.

   * Chemotherapy-induced neutropenia.

   * Underlying immunosuppression.

   * _____ mucositis.



PLAN:

   * Continue cefepime.

   * Continue fluconazole.

   * _____.

   * Start the patient on Mycelex troches.

   * Continue _____.

   * Continue Granix.

   * Monitor electrolytes.

   * The patient will follow up closely.



Thank you for allowing me to participate in the care of this patient.







DD: 02/19/2025 06:36 PM

DT: 02/19/2025 09:23 PM

TID: [id number] RECEIPT: [id number]

## 2025-02-20 NOTE — NUR
nursing pm note

patient alert and oriented times 4. wife at bedside. plan of care discussed with them and 
they verbalized understanding. patient has no pain tonight. he has slept about 6 hours 
tonight. call light within reach, bed alarm on, 2 side rails up. will continue to monitor 
patient.

## 2025-02-20 NOTE — PN
Patient is a 73-year-old male coming in to be evaluated for fever.  Per family 

member and patient he has been having fever for a couple of days.  He was 

evaluated at the urgent care and was told had an upper respiratory infection.  

Patient does has a history of cancer in his currently on chemotherapy.  Patient 

was to follow up at the emergency room per MD Gore for evaluation of a 

pneumonia.


Patient was found to have neutropenic fever.  With the patient was started on 

antibiotic treatment.  This patient was started on Granix.














PHYSICAL EXAM


HEAD/FACE:  No signs of trauma. 


EENT:  No eye pain, no blurred vision, no tearing, no double vision, no ear 

pain, no ear discharge, no nose pain, no nasal congestion, no throat pain, no 

throat swelling, no mouth pain. 


RESPIRATORY:   cough, no orthopnea, no SOB, no stridor, no wheezing. 


CARDIOVASCULAR:  No chest pain, no edema, no palpitations, no syncope. 


GASTROINTESTINAL/ABDOMINAL:   No abdominal pain, no constipation, no diarrhea, 

no nausea, no vomiting. 


GENITOURINARY:  No abnormal discharge, no dysuria, no frequent urination, no 

hematuria. No complaints of pain in the genitals. 


MUSCULOSKELETAL:  No back pain, no gout, no joint pain, no joint swelling, no 

muscle pain, no muscle stiffness, no neck pain. 


INTEGUMENTARY:  No change in color, no change in hair/nails, no dryness, no 

lesion, no lumps, no rash. 


NEUROLOGICAL/PSYCH:  No anxiety, not depressed, no emotional problem, no 

headache, no numbness, no pre-existing deficit, no history of seizures,  no 

tremors, no weakness. 


HEMATOLOGIC/LYMPHATIC:  Not anemic, no history of blood clots, no apparent 

bleeding, no bruising, glands not swollen.








Assessment





1.  History of esophageal cancer with the patient receiving chemotherapy 

treatment as a palliative with Dr. Teixeira in [facility].





2.  Chemo induced neutropenia





3.  Sepsis





4.  Failure to thrive





5.  Metastatic disease to the lung





Plan





1.  Continue neutropenic precaution





2.  Continue antibiotic treatment





3. continue  Granix 300 mcg subcu daily





4.  CBC daily.





5.  Will hold any chemotherapy treatment for this patient at this time


Vitals/Labs





Vital Signs








  Date Time  Temp Pulse Resp B/P (MAP) Pulse Ox O2 Delivery O2 Flow Rate FiO2


 


2/20/25 16:33 97.9 103 16 134/67 98 Room Air  


 


2/20/25 11:06        21


 


2/20/25 07:48       0.0 





Laboratory Tests


2/20/25 03:44








Medications





Current Medications


Acetaminophen 1,000 mg ONCE  ONCE PO Last administered on 2/17/25at 14:14;  

Start 2/17/25 at 13:00;  Stop 2/17/25 at 13:01;  Status DC


Ceftriaxone Sodium 1 gm ONCE  ONCE IVPB Last administered on 2/17/25at 14:09;  

Start 2/17/25 at 13:00;  Stop 2/17/25 at 13:01;  Status DC


Sodium Chloride 1,000 ml @  0 mls/hr ONCE  ONCE IV Last administered on 

2/17/25at 15:27;  Start 2/17/25 at 15:30;  Stop 2/17/25 at 15:31;  Status DC


Albuterol 1 udvial [id number] IH Last administered on 2/20/25at 11:04;  Start 

2/17/25 at 18:00;  Stop 3/19/25 at 17:59


Albuterol 1 udvial Q4HPRN  PRN IH;  Start 2/17/25 at 17:00;  Stop 3/19/25 at 

16:59


Azithromycin 250 ml @  250 mls/hr Q24H IVPB Last administered on 2/19/25at 

17:32;  Start 2/17/25 at 17:00;  Stop 2/22/25 at 16:59


Ceftriaxone Sodium 1 gm Q24H IVPB;  Start 2/17/25 at 17:00;  Stop 2/17/25 at 

17:05;  Status DC


Enoxaparin Sodium 40 mg DAILY SQ Last administered on 2/18/25at 09:54;  Start 

2/18/25 at 09:00;  Stop 3/20/25 at 08:59


Ceftriaxone Sodium 1 gm Q24H IVPB;  Start 2/18/25 at 13:00;  Stop 2/18/25 at 

08:13;  Status DC


Hydromorphone HCl 0.5 mg Q3H3  PRN IVP Last administered on 2/19/25at 18:48;  

Start 2/17/25 at 22:00;  Stop 2/22/25 at 21:59


Acetaminophen 650 mg Q6H  PRN PO Last administered on 2/18/25at 16:27;  Start 

2/17/25 at 22:00;  Stop 3/19/25 at 21:59


Acetaminophen 650 mg Q6H  PRN PO;  Start 2/17/25 at 22:00;  Stop 3/19/25 at 

21:59


Ondansetron HCl 4 mg Q6H  PRN IVP;  Start 2/17/25 at 22:00;  Stop 3/19/25 at 

21:59


Ceftriaxone Sodium 2 gm Q24H IVPB Last administered on 2/18/25at 12:42;  Start 

2/18/25 at 13:00;  Stop 2/18/25 at 13:52;  Status DC


Sodium Chloride 4 ml STK-MED ONCE IH Last administered on 2/18/25at 11:24;  

Start 2/18/25 at 11:19;  Stop 2/18/25 at 11:19;  Status DC


Vancomycin HCl 1 each AD IV;  Start 2/18/25 at 14:00;  Stop 2/18/25 at 14:33;  

Status DC


Cefepime HCl 2 gm Q12H IVPB Last administered on 2/20/25at 13:31;  Start 2/18/25

at 14:00;  Stop 2/28/25 at 13:59


Fluconazole/ Sodium Chloride 400 mg Q24H IV Last administered on 2/20/25at 

13:31;  Start 2/18/25 at 14:00;  Stop 2/28/25 at 13:59


Vancomycin HCl 500 ml @  250 mls/hr ONCE  ONCE IV;  Start 2/18/25 at 14:30;  

Stop 2/18/25 at 14:33;  Status DC


Vancomycin HCl 250 ml @  125 mls/hr Q12H IV;  Start 2/19/25 at 02:30;  Stop 

2/18/25 at 14:33;  Status DC


Sodium Chloride 4 ml STK-MED ONCE IH Last administered on 2/18/25at 19:36;  

Start 2/18/25 at 19:00;  Stop 2/18/25 at 19:00;  Status DC


Sodium Chloride 4 ml STK-MED ONCE IH Last administered on 2/18/25at 23:05;  

Start 2/18/25 at 22:41;  Stop 2/18/25 at 22:41;  Status DC


Clotrimazole 10 mg Q6H MM Last administered on 2/20/25at 10:24;  Start 2/19/25 

at 21:00;  Stop 3/21/25 at 20:59


Potassium Chloride 100 ml @  100 mls/hr AD  PRN IV;  Start 2/19/25 at 18:30;  

Stop 3/21/25 at 18:29


Potassium Chloride 20 meq AD  PRN PO;  Start 2/19/25 at 18:30;  Stop 3/21/25 at 

18:29


Potassium Chloride 20 meq AD  PRN PO Last administered on 2/19/25at 23:10;  

Start 2/19/25 at 18:30;  Stop 3/21/25 at 18:29


Pharmacy Profile Note 1 each ONCE MISC;  Start 2/19/25 at 18:30;  Stop 2/19/25 

at 18:37;  Status DC


Al Hydroxide/Mg Hydroxide/ Lidocaine HCl/ Diphenhydramine HCl TAKE (5-10)ML 

SWISH ... Q6H  PRN PO Last administered on 2/20/25at 13:32;  Start 2/19/25 at 

19:00;  Stop 3/21/25 at 18:59











SHADI MENDEZ MD              Feb 20, 2025 17:01

## 2025-02-20 NOTE — NUR
SPEECH TRIGGER COMPLETED.



Pt IS A 73 Y.O. MALE ADMITTED SECONDARY TO PNEUMONIA. Pt HAS A PAST MEDICAL HISTORY 
SIGNIFICANT FOR CANCER AND HEPATITIS. PATIENT PRESENTED WITH NO ACUTE APPEARING PULMONARY 
INFILTRATES ON MOST RECENT CHEST X-RAY (02/17/2025). Pt CURRENTLY ON REGULAR DIET (REGULAR 
TEXTURE AND THIN LIQUIDS). AS PER NURSE BROOKS, Pt TOLERATING DIET WITH NO OVERT S/S OF 
ASPIRATION. PLEASE REQUEST SPEECH THERAPY SERVICES FOR SKILLED BEDSIDE SWALLOW EVALUATION IF 
Pt PRESENTS WITH +S/S OF ASPIRATION SUCH AS COUGH RESPONSE, THROAT CLEAR, OR WET VOCAL 
QUALITY DURING ORAL INTAKE. ALL QUESTIONS ANSWERED AT THIS TIME.


-------------------------------------------------------------------------------

Addendum: 02/20/25 at 1807 by ST MARTHA FERREIRA

-------------------------------------------------------------------------------

Amended: Links added.

## 2025-02-21 NOTE — PN
Patient is a 73-year-old male coming in to be evaluated for fever.  Per family 

member and patient he has been having fever for a couple of days.  He was 

evaluated at the urgent care and was told had an upper respiratory infection.  

Patient does has a history of cancer in his currently on chemotherapy.  Patient 

was to follow up at the emergency room per MD Gore for evaluation of a 

pneumonia.


Patient was found to have neutropenic fever.  the patient was started on 

antibiotic treatment.  This patient was started on Granix. White blood count 

continue to be low.














PHYSICAL EXAM


HEAD/FACE:  No signs of trauma. 


EENT:  No eye pain, no blurred vision, no tearing, no double vision, no ear 

pain, no ear discharge, no nose pain, no nasal congestion, no throat pain, no 

throat swelling, no mouth pain. 


RESPIRATORY:   cough, no orthopnea, no SOB, no stridor, no wheezing. 


CARDIOVASCULAR:  No chest pain, no edema, no palpitations, no syncope. 


GASTROINTESTINAL/ABDOMINAL:   No abdominal pain, no constipation, no diarrhea, 

no nausea, no vomiting. 


GENITOURINARY:  No abnormal discharge, no dysuria, no frequent urination, no 

hematuria. No complaints of pain in the genitals. 


MUSCULOSKELETAL:  No back pain, no gout, no joint pain, no joint swelling, no 

muscle pain, no muscle stiffness, no neck pain. 


INTEGUMENTARY:  No change in color, no change in hair/nails, no dryness, no 

lesion, no lumps, no rash. 


NEUROLOGICAL/PSYCH:  No anxiety, not depressed, no emotional problem, no 

headache, no numbness, no pre-existing deficit, no history of seizures,  no 

tremors, no weakness. 


HEMATOLOGIC/LYMPHATIC:  Not anemic, no history of blood clots, no apparent 

bleeding, no bruising, glands not swollen.








Assessment





1.  History of esophageal cancer with the patient receiving chemotherapy 

treatment as a palliative with Dr. Teixeira in [facility].





2.  Chemo induced neutropenia





3.  Sepsis





4.  Failure to thrive





5.  Metastatic disease to the lung





Plan





1.  Continue neutropenic precaution





2.  Continue antibiotic treatment





3. continue  Granix 300 mcg subcu daily





4.  CBC daily.





5.  Will hold any chemotherapy treatment for this patient at this time





6.  Patient have Port-A-Cath.   his port could be used for antibiotics.


Vitals/Labs





Vital Signs








  Date Time  Temp Pulse Resp B/P (MAP) Pulse Ox O2 Delivery O2 Flow Rate FiO2


 


2/21/25 12:00 98.1 100 18 124/63 97 Room Air  


 


2/21/25 11:16        21


 


2/20/25 19:20       0 





Laboratory Tests


2/21/25 04:12








Medications





Current Medications


Acetaminophen 1,000 mg ONCE  ONCE PO Last administered on 2/17/25at 14:14;  

Start 2/17/25 at 13:00;  Stop 2/17/25 at 13:01;  Status DC


Ceftriaxone Sodium 1 gm ONCE  ONCE IVPB Last administered on 2/17/25at 14:09;  

Start 2/17/25 at 13:00;  Stop 2/17/25 at 13:01;  Status DC


Sodium Chloride 1,000 ml @  0 mls/hr ONCE  ONCE IV Last administered on 

2/17/25at 15:27;  Start 2/17/25 at 15:30;  Stop 2/17/25 at 15:31;  Status DC


Albuterol 1 udvial [id number] IH Last administered on 2/21/25at 11:17;  Start 

2/17/25 at 18:00;  Stop 3/19/25 at 17:59


Albuterol 1 udvial Q4HPRN  PRN IH;  Start 2/17/25 at 17:00;  Stop 3/19/25 at 

16:59


Azithromycin 250 ml @  250 mls/hr Q24H IVPB Last administered on 2/20/25at 

18:46;  Start 2/17/25 at 17:00;  Stop 2/22/25 at 16:59


Ceftriaxone Sodium 1 gm Q24H IVPB;  Start 2/17/25 at 17:00;  Stop 2/17/25 at 

17:05;  Status DC


Enoxaparin Sodium 40 mg DAILY SQ Last administered on 2/21/25at 09:12;  Start 

2/18/25 at 09:00;  Stop 3/20/25 at 08:59


Ceftriaxone Sodium 1 gm Q24H IVPB;  Start 2/18/25 at 13:00;  Stop 2/18/25 at 

08:13;  Status DC


Hydromorphone HCl 0.5 mg Q3H3  PRN IVP Last administered on 2/20/25at 18:48;  

Start 2/17/25 at 22:00;  Stop 2/22/25 at 21:59


Acetaminophen 650 mg Q6H  PRN PO Last administered on 2/18/25at 16:27;  Start 

2/17/25 at 22:00;  Stop 3/19/25 at 21:59


Acetaminophen 650 mg Q6H  PRN PO;  Start 2/17/25 at 22:00;  Stop 3/19/25 at 

21:59


Ondansetron HCl 4 mg Q6H  PRN IVP;  Start 2/17/25 at 22:00;  Stop 3/19/25 at 

21:59


Ceftriaxone Sodium 2 gm Q24H IVPB Last administered on 2/18/25at 12:42;  Start 

2/18/25 at 13:00;  Stop 2/18/25 at 13:52;  Status DC


Sodium Chloride 4 ml STK-MED ONCE IH Last administered on 2/18/25at 11:24;  

Start 2/18/25 at 11:19;  Stop 2/18/25 at 11:19;  Status DC


Vancomycin HCl 1 each AD IV;  Start 2/18/25 at 14:00;  Stop 2/18/25 at 14:33;  

Status DC


Cefepime HCl 2 gm Q12H IVPB Last administered on 2/21/25at 01:18;  Start 2/18/25

at 14:00;  Stop 2/28/25 at 13:59


Fluconazole/ Sodium Chloride 400 mg Q24H IV Last administered on 2/20/25at 

13:31;  Start 2/18/25 at 14:00;  Stop 2/28/25 at 13:59


Vancomycin HCl 500 ml @  250 mls/hr ONCE  ONCE IV;  Start 2/18/25 at 14:30;  

Stop 2/18/25 at 14:33;  Status DC


Vancomycin HCl 250 ml @  125 mls/hr Q12H IV;  Start 2/19/25 at 02:30;  Stop 

2/18/25 at 14:33;  Status DC


Sodium Chloride 4 ml STK-MED ONCE IH Last administered on 2/18/25at 19:36;  

Start 2/18/25 at 19:00;  Stop 2/18/25 at 19:00;  Status DC


Sodium Chloride 4 ml STK-MED ONCE IH Last administered on 2/18/25at 23:05;  

Start 2/18/25 at 22:41;  Stop 2/18/25 at 22:41;  Status DC


Clotrimazole 10 mg Q6H MM Last administered on 2/21/25at 09:12;  Start 2/19/25 

at 21:00;  Stop 3/21/25 at 20:59


Potassium Chloride 100 ml @  100 mls/hr AD  PRN IV;  Start 2/19/25 at 18:30;  

Stop 3/21/25 at 18:29


Potassium Chloride 20 meq AD  PRN PO;  Start 2/19/25 at 18:30;  Stop 3/21/25 at 

18:29


Potassium Chloride 20 meq AD  PRN PO Last administered on 2/19/25at 23:10;  

Start 2/19/25 at 18:30;  Stop 3/21/25 at 18:29


Pharmacy Profile Note 1 each ONCE MISC;  Start 2/19/25 at 18:30;  Stop 2/19/25 

at 18:37;  Status DC


Al Hydroxide/Mg Hydroxide/ Lidocaine HCl/ Diphenhydramine HCl TAKE (5-10)ML 

SWISH ... Q6H  PRN PO Last administered on 2/21/25at 09:15;  Start 2/19/25 at 

19:00;  Stop 3/21/25 at 18:59











SHADI MENDEZ MD              Feb 21, 2025 15:00

## 2025-02-21 NOTE — PN
INFECTIOUS DISEASE  PROGRESS NOTE








Date of Service: 2025





SUBJECTIVE:


This is a 73-year-old male patient who was seen and examined at bedside in room 

407.  


Patient is awake, alert and oriented x3.  Patient's oral mucositis improving.  

Continues on Mycelex and magic mouthwash.  No fever reported this morning, 

temperature is 98.1.  Slight improvement on the WBC slightly to 1.6.  Continues

on Granix as recommended by oncologist. The sputum culture results growing 

Candida albicans.  Patient continues on fluconazole, cefepime and azithromycin. 

No reports of nausea or vomiting.  We will continue to monitor patient's care.








PHYSICAL EXAM


EYES:  Anicteric.  Pupils equal and reactive.


HENT: No oral thrush seen, moist,  Oral mucositis.


NECK:  Supple, no JVD or thyromegaly.


LUNGS:  Good air entry.  No rales, no rhonchi.  Crackles.


CARDIOVASCULAR:  S1, S2 regular.  No murmur heard.


ABDOMEN:  Soft, non tender, bowel sounds present, no organomegaly.


CENTRAL NERVOUS SYSTEM:  Awake, alert, oriented x 3.  


SKIN:  No rashes, no swelling.


LYMPHATICS: No peripheral lymphadenopathy.


MUSCULOSKELETAL:  No joint swelling, erythema or tenderness.


EXTREMITIES:  No cyanosis or clubbing.


BACK:  No deformity, no pressure ulcer.


GENITOURINARY:  No dysuria or hematuria. 








Vital Sign (Last 12 Hours)








 25





 03:28 06:28 06:29 08:00


 


Temp 99.1   98.1


 


Pulse 96 93 93 95


 


Resp 20 18 18 18


 


B/P (MAP) 118/59   123/73


 


Pulse Ox 95   98


 


O2 Delivery Room Air N/A Room Air  Room Air


 


FiO2  21  


 


    





 25 





 11:16 11:17 12:00 


 


Temp   98.1 


 


Pulse 97 97 100 


 


Resp 18 18 18 


 


B/P (MAP)   124/63 


 


Pulse Ox   97 


 


O2 Delivery N/A Room Air  Room Air 


 


FiO2 21   














Intake & Output (last 24hrs)   


 


 25





 15:00 23:00 07:00


 


Intake Total  1250 ml 100.0 ml


 


Output Total  400 ml 500 ml


 


Balance  850 ml -400.0 ml











LABS:








                                   Laboratory:








Test


 2/21/25


04:12 Range/Units


 


 


White Blood Count 1.6 L 4.8-10.8  K/uL


 


Red Blood Count


 3.20 L


 4.50-6.20


MIL/uL


 


Hemoglobin 9.6 L 14.0-18.0  g/dL


 


Hematocrit 28.2 L 42-54  %


 


Mean Corpuscular Volume 88.1  79-99  fL


 


Mean Corpuscular Hemoglobin 30.0  27.0-33.0  pg


 


Mean Corpuscular Hemoglobin


Concent 34.0 


 32.0-36.0  g/dL





 


Red Cell Distribution Width 14.0  11.0-15.5  %


 


Platelet Count 155 # 130-400  K/uL


 


Mean Platelet Volume 9.9  7.5-10.5  fL


 


Immature Granulocyte % (Auto) 0.0  0-1  %


 


Neutrophils (%) (Auto) 8.7 L 40.0-77.0  %


 


Lymphocytes (%) (Auto) 29.2  21.0-51.0  %


 


Monocytes (%) (Auto) 61.5 H 3.0-13.0  %


 


Eosinophils (%) (Auto) 0.0  0.0-8.0  %


 


Basophils (%) (Auto) 0.6  0.0-5.0  %


 


Neutrophils # (Auto) 0.1 L 1.8-7.7  K/uL


 


Lymphocytes # (Auto) 0.5 L 1.0-4.8  K/uL


 


Monocytes # (Auto) 1.0  0.1-1.0  K/uL


 


Eosinophils # (Auto) 0.00  0.00-0.70  K/uL


 


Basophils # (Auto) 0.01  0.00-0.20  K/uL


 


Absolute Immature Granulocyte


(auto 0.00 


 0-1  K/uL





 


Nucleated Red Blood Cells 0.0  0.0-0.19  %


 


Sodium Level 130 L 136-145  mmol/L


 


Potassium Level 3.9  3.5-5.1  mmol/L


 


Chloride Level 98 L 101-111  mmol/L


 


Carbon Dioxide Level 26  21-32  mmol/L


 


Blood Urea Nitrogen 7  7-18  mg/dL


 


Creatinine 0.7  0.5-1.3  mg/dL


 


Glomerular Filtration Rate


Calc 97 


 >90  mL/min





 


Random Glucose 97    mg/dL


 


Total Calcium 8.0 L 8.5-10.1  mg/dL


 


Total Bilirubin 0.6  0.2-1.0  mg/dL


 


Aspartate Amino Transf


(AST/SGOT) 22 


 10-37  U/L





 


Alanine Aminotransferase


(ALT/SGPT) 25 


 12-78  U/L





 


Alkaline Phosphatase 59    U/L


 


Total Protein 7.1  6.0-8.3  g/dL


 


Albumin 2.3 L 3.5-5.0  g/dL











DIAGNOSTICS / RADIOLOGY:








PATIENT: NIKOLAY SIDHU                            MR#: [id number]


ACCOUNT#: [id number]                              : 1951


SEX: M AGE: 73                                      LOCATION: [facility]


ORDER DT: 25 1241                             STATUS: REG ER


ACCESSION#: [id number]                            REPORT#: [id number]


SERVICE DT: 25 1239





REASON: fever


ORDERING PHYSICIAN: MARIE DOZIER MD


PROCEDURE: CXR1VW  -  CHEST 1VW





CHEST 1VW





REASON: fever





COMPARISON: None.





FINDINGS:  


There is 2 cm well-circumscribed mass midportion of the right lung.


There is some faint nodularity in the left lung which could be some


smaller nodules. There are no acute appearing infiltrates. Heart size is


normal. There is a chest port in place. Be some and bony thorax appear


unremarkable.





IMPRESSION:





1. 2 cm nodule right midlung, there may be some smaller nodules present


in the left lung.


2. No evidence of an acute infiltrate.








DICTATED BY: EMMETT ARTHUR MD


DATE: 25





ASSESSMENT:


Healthcare associated pneumonia.  


Sepsis.  


Chemotherapy induced neutropenia. 


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  


Underlying immunosuppression.  


Oral Mucositis. 


Hyponatremia.








PLAN:


Continue fluconazole.  


Continue cefepime.  


Continue azithromycin.  


Continue Mycelex.  


Continue magic mouthwash.


We will follow up on the culture results.  


Continues on Granix as recommended by Oncologist.





This case was reviewed and discussed with my supervising physician and the above

assessment and plan was formulated and agreed upon.


ATTESTATION BY PHYSICIAN


I have seen and examined the patient. I reviewed the documentation, medical 

decision making, and treatment plan as noted by the mid-level provider above. I 

agree with the findings and plan of care.











TOO COFFEY MD, MIRTA L [facility]             2025 13:44

## 2025-02-21 NOTE — PN
[facility] PROGRESS NOTE 





Date Patient Seen: Feb 21, 2025 


Time of Visit: 16:50


Supervising Physician: JOAO ANDREW





Primary Care Physician: Wojciech Harper MD 


Outpatient Specialists: 


Inpatient Consults: Jaquan Bills MD





PROBLEM LIST:


Sepsis


Neutropenic fever


Severe neutropenia, POA


PNA , + Candida 


Esophageal CA with Mets


Immunodeficiency


Maintenance chemotherapy 


History of hepatitis 


Hypertension 


Chronic Generalized rash to face and scalp worsening, POA





INTERVAL HISTORY:


Patient was seen at bedside sleeping easily aroused, No family at bedside .  He 

remains on neutropenic precautions currently on Diflucan cefepime and 

azithromycin. He is s/p Granix. 


Tracheal cultures have revealed Candida.





REVIEW OF SYSTEMS: 


12 point ROS reviewed with patient. Pertinent positives mentioned above. 

Otherwise negative.





PHYSICAL EXAM: 


GENERAL: alert, weak, awake oriented x 3


HEENT: EOMI, Sclera non icteric, moist mucosa , generalized rash to face and 

scalp


NECK: Supple, no JVD, trachea midline 


LUNGS: diminished  breath sounds bilaterally. No wheezes


HEART: Regular rate and rhythm. Normal S1 and S2, without murmurs 


ABD: Abdomen soft, nontender. Bowel sounds present


EXT: No clubbing cyanosis or edema


NEURO: Alert and oriented to person, follows commands





                             Vital Signs (last 8hr)








  Date Time  Temp Pulse Resp B/P (MAP) Pulse Ox O2 Delivery O2 Flow Rate FiO2


 


2/21/25 12:00 98.1 100 18 124/63 97 Room Air  


 


2/21/25 11:17  97 18     


 


2/21/25 11:16  97 18   N/A Room Air  21











LABS:





                                Hematology Labs:








Test


 2/21/25


04:12 Range/Units


 


 


White Blood Count 1.6 L 4.8-10.8  K/uL


 


Red Blood Count


 3.20 L


 4.50-6.20


MIL/uL


 


Hemoglobin 9.6 L 14.0-18.0  g/dL


 


Hematocrit 28.2 L 42-54  %


 


Mean Corpuscular Volume 88.1  79-99  fL


 


Mean Corpuscular Hemoglobin 30.0  27.0-33.0  pg


 


Mean Corpuscular Hemoglobin


Concent 34.0 


 32.0-36.0  g/dL





 


Red Cell Distribution Width 14.0  11.0-15.5  %


 


Platelet Count 155 # 130-400  K/uL


 


Mean Platelet Volume 9.9  7.5-10.5  fL


 


Immature Granulocyte % (Auto) 0.0  0-1  %


 


Neutrophils (%) (Auto) 8.7 L 40.0-77.0  %


 


Lymphocytes (%) (Auto) 29.2  21.0-51.0  %


 


Monocytes (%) (Auto) 61.5 H 3.0-13.0  %


 


Eosinophils (%) (Auto) 0.0  0.0-8.0  %


 


Basophils (%) (Auto) 0.6  0.0-5.0  %


 


Neutrophils # (Auto) 0.1 L 1.8-7.7  K/uL


 


Lymphocytes # (Auto) 0.5 L 1.0-4.8  K/uL


 


Monocytes # (Auto) 1.0  0.1-1.0  K/uL


 


Eosinophils # (Auto) 0.00  0.00-0.70  K/uL


 


Basophils # (Auto) 0.01  0.00-0.20  K/uL


 


Absolute Immature Granulocyte


(auto 0.00 


 0-1  K/uL





 


Nucleated Red Blood Cells 0.0  0.0-0.19  %








                                 Chemistry Labs:








Test


 2/21/25


04:12 Range/Units


 


 


Sodium Level 130 L 136-145  mmol/L


 


Potassium Level 3.9  3.5-5.1  mmol/L


 


Chloride Level 98 L 101-111  mmol/L


 


Carbon Dioxide Level 26  21-32  mmol/L


 


Blood Urea Nitrogen 7  7-18  mg/dL


 


Creatinine 0.7  0.5-1.3  mg/dL


 


Glomerular Filtration Rate


Calc 97 


 >90  mL/min





 


Random Glucose 97    mg/dL


 


Total Calcium 8.0 L 8.5-10.1  mg/dL


 


Total Bilirubin 0.6  0.2-1.0  mg/dL


 


Aspartate Amino Transf


(AST/SGOT) 22 


 10-37  U/L





 


Alanine Aminotransferase


(ALT/SGPT) 25 


 12-78  U/L





 


Alkaline Phosphatase 59    U/L


 


Total Protein 7.1  6.0-8.3  g/dL


 


Albumin 2.3 L 3.5-5.0  g/dL











DIAGNOSTICS / RADIOLOGY RESULTS:


[ ]





PLAN 


Follow oncology recommendations 


Broad spectrum antibiotics


follow ID recs 


panculture 


Negative for thyphoid, Influenza A and B, covid and strep


CT of the chest reviewed + metastatic findings 


 





NEURO: 


Minimize central acting medications as possible. 


Maintain fall precautions, adequate lighting during the day





PULMONARY: 


Supplemental 02 as needed. 


Maintain aspiration precautions at all times





CARDIOVASCULAR: 


Follow hemodynamics. 


Vital signs per facility protocol





GI & NUTRITION:


Continue with nutritional support.


Continue stool softeners and laxatives as needed.





KIDNEYS & ELECTROLYTES: 


Strict monitoring of intake, output and overall fluid balance. 


Avoid nephrotoxic medications to the extent possible. 


Medications to be dosed according to renal function.


Monitor electrolytes and replace as needed





ENDOCRINE:


Maintain blood glucose between 100-180 at all times.


Hypoglycemia protocol in place





INFECTIOUS DISEASE: 


Trend temperature, WBC and procalcitonin level


Follow cultures, deescalate antibiotics as soon as possible.


Panculture if new onset fever





ONCOLOGY/HEMATOLOGY/COAGULATION: 


Monitor for s/s of bleeding


Monitor hemoglobin, coagulation studies as needed





SKIN:


Pressure ulcer prevention per facility protocol


Specialty mattress





ORTHO/REHAB:


Continue PT/OT 





Prophylaxis:


Continue GI and DVT prophylaxis





Code Status: 


Full Resuscitation





Disposition:


TBD





Other:


Total patient care time exceeds 35 minutes excluding all procedures.











ANA SHERIDAN              Feb 21, 2025 16:52

## 2025-02-21 NOTE — PN
SUBJECTIVE:  The patient was admitted for assessment and treatment of absolute 

neutropenia, fever chemotherapy-induced neutropenia, sepsis.  The patient was 

started on Granix.  He was also started on IV antibiotics, has been placed on 

reverse isolation.  He has been treated for esophageal cancer with metastasis to

the lung, currently denies any fever, chills, seizures.  No chest pain, 

palpitations.  No cough, wheeze or rhonchi.  No nausea, vomiting, diarrhea.



OBJECTIVE:

GENERAL:  He is awake, alert, oriented to person, time and place.  Not in 

distress.

VITAL SIGNS:  Blood pressure 123/73, pulse 95, respiratory rate 18, temperature 

98.1.

HEENT:  Normocephalic, atraumatic.

LUNGS:  Clear to auscultation.

HEART:  S1, S2 are distant.

ABDOMEN:  Soft, nontender.

EXTREMITIES:  No clubbing, cyanosis.



LABORATORY DATA:  WBC count 1.6, hemoglobin 9.6, platelets 155.  Sodium 130, 

potassium 3.9, BUN 7, creatinine 0.7, albumin 2.3.  Influenza A and B negative. 

COVID negative.  Urine test was negative for leukocyte esterase and nitrite.  

Respiratory cultures were positive for Candida albicans.



ASSESSMENT AND PLAN:

   * Absolute neutropenia, resolving.  Continue with Granix as per oncology.

   * Positive cultures for Candida.  Continue with Diflucan.

   * Fever and neutropenia.  Continue with cefepime and Zithromax.

   * Hyponatremia.  Continue to monitor.

   * Esophageal cancer.  Follow up by oncologist.

   * Follow up in a.m. with results of tests.





DOS: 02/21/2025

DD: 02/21/2025 10:56 AM

DT: 02/21/2025 11:55 AM

TID: [id number] RECEIPT: [id number]

[facility]

## 2025-02-22 NOTE — PN
SUBJECTIVE:  The patient is examined at bedside, comfortable, afebrile, 

tolerating oral intake.  He is on IV antibiotics.



OBJECTIVE:

GENERAL:  He is currently, awake, alert, oriented in person, time, and place, 

not in distress.

VITAL SIGNS:  Blood pressure of 122/61, pulse 99, respiratory rate is 18.

HEENT:  Normocephalic, atraumatic.

LUNGS:  Clear to auscultation.

HEART:  S1, S2 are distant.

ABDOMEN:  Soft, nontender.

EXTREMITIES:  No clubbing or cyanosis, no edema.



LABORATORY DATA:  WBC count up to 5.3, hemoglobin 10, platelets up to 192,000.  

Sodium 133, potassium 3.7, BUN 7, creatinine is 0.7, albumin 2.4.



ASSESSMENT AND PLAN:

   * Absolute neutropenia induced by chemotherapy, resolved.  Continue 

     recommendations by Oncology.

   * Neutropenia, fever, sepsis.  Continue current antibiotics.

   * Positive sputum cultures for Candida.  Continue with Diflucan.

   * Esophageal cancer.  Chemotherapy has been placed on hold.  There is 

     apparent metastatic disease to the lung.  Continue to monitor.  Plan to 

     discharge when cleared by Oncology.





DOS: 02/22/2025

DD: 02/22/2025 02:54 PM

DT: 02/22/2025 08:39 PM

TID: [id number] RECEIPT: [id number]

MTDD

## 2025-02-22 NOTE — PN
[facility] PROGRESS NOTE 





Date Patient Seen: Feb 22, 2025 


Time of Visit: 17:46


Supervising Physician: JOAO ANDREW





Primary Care Physician: Wojciech Harper MD 


Outpatient Specialists: 


Inpatient Consults: Jaquan Bills MD





PROBLEM LIST:


Sepsis, RESOLVED 


Neutropenic fever


Severe neutropenia, POA


PNA , + Candida 


Esophageal CA with Mets


Immunodeficiency


Maintenance chemotherapy 


History of hepatitis 


Hypertension 


Chronic Generalized rash to face and scalp worsening, POA





INTERVAL HISTORY:


Patient was seen at bedside sleeping easily aroused with family at bedside .  He

remains on neutropenic precautions currently on Diflucan cefepime and 

azithromycin. He is s/p Granix.  improved WBC  at 5.2 , in good spirits 


Tracheal cultures have revealed Candida.





REVIEW OF SYSTEMS: 


12 point ROS reviewed with patient. Pertinent positives mentioned above. 

Otherwise negative.





PHYSICAL EXAM: 


GENERAL: alert, weak, awake oriented x 3


HEENT: EOMI, Sclera non icteric, moist mucosa , generalized rash to face and 

scalp


NECK: Supple, no JVD, trachea midline 


LUNGS: diminished  breath sounds bilaterally. No wheezes


HEART: Regular rate and rhythm. Normal S1 and S2, without murmurs 


ABD: Abdomen soft, nontender. Bowel sounds present


EXT: No clubbing cyanosis or edema


NEURO: Alert and oriented to person, follows commands





                             Vital Signs (last 8hr)








  Date Time  Temp Pulse Resp B/P (MAP) Pulse Ox O2 Delivery O2 Flow Rate FiO2


 


2/22/25 12:00 98.4 99 18 122/61 96 Room Air  21


 


2/22/25 11:43  86 18     


 


2/22/25 11:42  86 18   N/A Room Air  21











LABS:





                                Hematology Labs:








Test


 2/22/25


04:27 Range/Units


 


 


White Blood Count 5.3  4.8-10.8  K/uL


 


Red Blood Count


 3.29 L


 4.50-6.20


MIL/uL


 


Hemoglobin 10.0 L 14.0-18.0  g/dL


 


Hematocrit 29.1 L 42-54  %


 


Mean Corpuscular Volume 88.4  79-99  fL


 


Mean Corpuscular Hemoglobin 30.4  27.0-33.0  pg


 


Mean Corpuscular Hemoglobin


Concent 34.4 


 32.0-36.0  g/dL





 


Red Cell Distribution Width 14.2  11.0-15.5  %


 


Platelet Count 192  130-400  K/uL


 


Mean Platelet Volume 9.6  7.5-10.5  fL


 


Immature Granulocyte % (Auto) 1.1 H 0-1  %


 


Neutrophils (%) (Auto) 49.2  40.0-77.0  %


 


Lymphocytes (%) (Auto) 15.9 L 21.0-51.0  %


 


Monocytes (%) (Auto) 32.9 H 3.0-13.0  %


 


Eosinophils (%) (Auto) 0.0  0.0-8.0  %


 


Basophils (%) (Auto) 0.9  0.0-5.0  %


 


Neutrophils # (Auto) 2.6  1.8-7.7  K/uL


 


Lymphocytes # (Auto) 0.8 L 1.0-4.8  K/uL


 


Monocytes # (Auto) 1.7 H 0.1-1.0  K/uL


 


Eosinophils # (Auto) 0.00  0.00-0.70  K/uL


 


Basophils # (Auto) 0.05  0.00-0.20  K/uL


 


Absolute Immature Granulocyte


(auto 0.06 


 0-1  K/uL





 


Nucleated Red Blood Cells 0.0  0.0-0.19  %








                                 Chemistry Labs:








Test


 2/22/25


04:27 Range/Units


 


 


Sodium Level 133 L 136-145  mmol/L


 


Potassium Level 3.7  3.5-5.1  mmol/L


 


Chloride Level 101  101-111  mmol/L


 


Carbon Dioxide Level 27  21-32  mmol/L


 


Blood Urea Nitrogen 7  7-18  mg/dL


 


Creatinine 0.7  0.5-1.3  mg/dL


 


Glomerular Filtration Rate


Calc 97 


 >90  mL/min





 


Random Glucose 99    mg/dL


 


Total Calcium 8.1 L 8.5-10.1  mg/dL


 


Total Bilirubin 0.4  0.2-1.0  mg/dL


 


Aspartate Amino Transf


(AST/SGOT) 19 


 10-37  U/L





 


Alanine Aminotransferase


(ALT/SGPT) 24 


 12-78  U/L





 


Alkaline Phosphatase 64    U/L


 


Total Protein 7.1  6.0-8.3  g/dL


 


Albumin 2.4 L 3.5-5.0  g/dL











DIAGNOSTICS / RADIOLOGY RESULTS:


[ ]





PLAN 


Follow oncology recommendations 


Broad spectrum antibiotics


follow ID recs 


panculture 


Negative for thyphoid, Influenza A and B, covid and strep


CT of the chest reviewed + metastatic findings 


 





NEURO: 


Minimize central acting medications as possible. 


Maintain fall precautions, adequate lighting during the day





PULMONARY: 


Supplemental 02 as needed. 


Maintain aspiration precautions at all times





CARDIOVASCULAR: 


Follow hemodynamics. 


Vital signs per facility protocol





GI & NUTRITION:


Continue with nutritional support.


Continue stool softeners and laxatives as needed.





KIDNEYS & ELECTROLYTES: 


Strict monitoring of intake, output and overall fluid balance. 


Avoid nephrotoxic medications to the extent possible. 


Medications to be dosed according to renal function.


Monitor electrolytes and replace as needed





ENDOCRINE:


Maintain blood glucose between 100-180 at all times.


Hypoglycemia protocol in place





INFECTIOUS DISEASE: 


Trend temperature, WBC and procalcitonin level


Follow cultures, deescalate antibiotics as soon as possible.


Panculture if new onset fever





ONCOLOGY/HEMATOLOGY/COAGULATION: 


Monitor for s/s of bleeding


Monitor hemoglobin, coagulation studies as needed





SKIN:


Pressure ulcer prevention per facility protocol


Specialty mattress





ORTHO/REHAB:


Continue PT/OT 





Prophylaxis:


Continue GI and DVT prophylaxis





Code Status: 


Full Resuscitation





Disposition:


TBD





Other:


Total patient care time exceeds 35 minutes excluding all procedures.











ANA SHERIDAN              Feb 22, 2025 17:47

## 2025-02-23 NOTE — HMCIMG
CHEST 1VW



HISTORY: Pneumonia



COMPARISON: None



FINDINGS: A frontal projection of the chest was obtained. There are

bilateral lower lung reticular nodular pulmonary infiltrates. There are

superimposed bilateral lower lung nodules.   The heart is borderline

enlarged.  Port-A-Cath is seen entering from the right.  No evidence of

aortic calcification is seen.



IMPRESSION:

1. Bilateral lower lung reticular nodular pulmonary infiltrates,

nodules. There are superimposed bilateral lower lung nodules.

## 2025-02-23 NOTE — PN
INFECTIOUS DISEASE FOLLOWUP NOTE



DATE OF SERVICE:  02/22/2025



SUBJECTIVE:  The patient is seen and examined today.  The patient looks 

clinically much better.  No nausea, no vomiting.  No bleeding tendencies.  No 

palpitations or orthopnea.  Denies dysuria or urinary frequency.



PHYSICAL EXAMINATION:

VITAL SIGNS:  Temperature today 98.4.

EYES:  No icterus.  Pupils are equal and reactive.

HENT:  No oral thrush seen.  Moist oral mucosa.

NECK:  Supple.  No JVD or thyromegaly.

LUNGS:  Good air entry.  No rales.  No rhonchi.

CARDIOVASCULAR:  S1, S2 regular.  No murmur heard.

ABDOMEN:  Full, obese, soft, nontender.  Bowel sounds present.

CENTRAL NERVOUS SYSTEM:  Awake, alert, oriented x 3.  No focal deficits.

SKIN:  No rashes.  No itchiness.

LYMPHATIC:  No peripheral lymphadenopathy.



LABORATORY DATA:  WBC 5.3.



ASSESSMENT:  A 73-year-old male with multiple problems, which include:

   * Sepsis.

   * Pneumonia.

   * Esophageal cancer.

   * Chemotherapy-induced mucositis.

   * Hyponatremia.

   * Neutropenia.

   * Obesity.



PLAN:

   * Continue fluconazole.

   * Continue cefepime.

   * Continue isolation.

   * Continue pain management.

   * Continue antiemetics.

   * Monitor electrolytes and correct as needed.







DD: 02/22/2025 02:48 PM

DT: 02/22/2025 08:23 PM

TID: [id number] RECEIPT: [id number]

## 2025-02-23 NOTE — PN
[facility] PROGRESS NOTE 





Date Patient Seen: Feb 23, 2025 


Time of Visit: 15:20


Supervising Physician: JOAO ANDREW





Primary Care Physician: Wojciech Harper MD 


Outpatient Specialists: 


Inpatient Consults: Jaquan Bills MD





PROBLEM LIST:


Sepsis, RESOLVED 


Neutropenic fever


Severe neutropenia, POA


PNA , + Candida 


Esophageal CA with Mets


Immunodeficiency


Maintenance chemotherapy 


History of hepatitis 


Hypertension 


Chronic Generalized rash to face and scalp worsening, POA





INTERVAL HISTORY:


Patient was seen with family at bedside, He is awake and alert and in good 

spirits on room air.  He remains on neutropenic precautions currently on 

Diflucan cefepime and azithromycin. He is s/p Granix.  improved WBC  at 13.6  


Tracheal cultures have revealed Candida.CXR reveals : Bilateral lower lung 

reticular nodular pulmonary infiltrates,nodules. There are superimposed 

bilateral lower lung nodules. Which are unchanged from prior studies and cons

istent with his history of metastasis





REVIEW OF SYSTEMS: 


12 point ROS reviewed with patient. Pertinent positives mentioned above. 

Otherwise negative.





PHYSICAL EXAM: 


GENERAL: alert, weak, awake oriented x 3


HEENT: EOMI, Sclera non icteric, moist mucosa , generalized rash to face and 

scalp


NECK: Supple, no JVD, trachea midline 


LUNGS: diminished  breath sounds bilaterally. No wheezes


HEART: Regular rate and rhythm. Normal S1 and S2, without murmurs 


ABD: Abdomen soft, nontender. Bowel sounds present


EXT: No clubbing cyanosis or edema


NEURO: Alert and oriented to person, follows commands





                             Vital Signs (last 8hr)








  Date Time  Temp Pulse Resp B/P (MAP) Pulse Ox O2 Delivery O2 Flow Rate FiO2


 


2/23/25 11:43 98.8 85 18 132/68 96 Room Air  21


 


2/23/25 11:24  81 18     


 


2/23/25 08:00 99.0 99 20 129/67 95 Room Air  21











LABS:





                                Hematology Labs:








Test


 2/23/25


11:42 Range/Units


 


 


White Blood Count 13.6 H 4.8-10.8  K/uL


 


Red Blood Count


 3.57 L


 4.50-6.20


MIL/uL


 


Hemoglobin 10.8 L 14.0-18.0  g/dL


 


Hematocrit 32.1 L 42-54  %


 


Mean Corpuscular Volume 89.9  79-99  fL


 


Mean Corpuscular Hemoglobin 30.3  27.0-33.0  pg


 


Mean Corpuscular Hemoglobin


Concent 33.6 


 32.0-36.0  g/dL





 


Red Cell Distribution Width 14.4  11.0-15.5  %


 


Platelet Count 261 # 130-400  K/uL


 


Mean Platelet Volume 9.4  7.5-10.5  fL


 


Immature Granulocyte % (Auto) 4.4 H 0-1  %


 


Neutrophils (%) (Auto) 71.9  40.0-77.0  %


 


Lymphocytes (%) (Auto) 10.5 L 21.0-51.0  %


 


Monocytes (%) (Auto) 12.4  3.0-13.0  %


 


Eosinophils (%) (Auto) 0.0  0.0-8.0  %


 


Basophils (%) (Auto) 0.8  0.0-5.0  %


 


Neutrophils # (Auto) 9.8 H 1.8-7.7  K/uL


 


Lymphocytes # (Auto) 1.4  1.0-4.8  K/uL


 


Monocytes # (Auto) 1.7 H 0.1-1.0  K/uL


 


Eosinophils # (Auto) 0.00  0.00-0.70  K/uL


 


Basophils # (Auto) 0.11  0.00-0.20  K/uL


 


Absolute Immature Granulocyte


(auto 0.60 


 0-1  K/uL





 


Nucleated Red Blood Cells 0.1  0.0-0.19  %








                                 Chemistry Labs:








Test


 2/23/25


11:42 Range/Units


 


 


Sodium Level 131 L 136-145  mmol/L


 


Potassium Level 3.5  3.5-5.1  mmol/L


 


Chloride Level 99 L 101-111  mmol/L


 


Carbon Dioxide Level 28  21-32  mmol/L


 


Blood Urea Nitrogen 7  7-18  mg/dL


 


Creatinine 0.7  0.5-1.3  mg/dL


 


Glomerular Filtration Rate


Calc 97 


 >90  mL/min





 


Random Glucose 88    mg/dL


 


Total Calcium 8.3 L 8.5-10.1  mg/dL


 


Total Bilirubin 0.3  0.2-1.0  mg/dL


 


Aspartate Amino Transf


(AST/SGOT) 19 


 10-37  U/L





 


Alanine Aminotransferase


(ALT/SGPT) 21 


 12-78  U/L





 


Alkaline Phosphatase 107    U/L


 


Total Protein 7.5  6.0-8.3  g/dL


 


Albumin 2.6 L 3.5-5.0  g/dL











DIAGNOSTICS / RADIOLOGY RESULTS:


[ ]





PLAN 


Follow oncology recommendations 


Broad spectrum antibiotics


follow ID recs 


panculture 


Negative for thyphoid, Influenza A and B, covid and strep


CT of the chest reviewed + metastatic findings 


 





NEURO: 


Minimize central acting medications as possible. 


Maintain fall precautions, adequate lighting during the day





PULMONARY: 


Supplemental 02 as needed. 


Maintain aspiration precautions at all times





CARDIOVASCULAR: 


Follow hemodynamics. 


Vital signs per facility protocol





GI & NUTRITION:


Continue with nutritional support.


Continue stool softeners and laxatives as needed.





KIDNEYS & ELECTROLYTES: 


Strict monitoring of intake, output and overall fluid balance. 


Avoid nephrotoxic medications to the extent possible. 


Medications to be dosed according to renal function.


Monitor electrolytes and replace as needed





ENDOCRINE:


Maintain blood glucose between 100-180 at all times.


Hypoglycemia protocol in place





INFECTIOUS DISEASE: 


Trend temperature, WBC and procalcitonin level


Follow cultures, deescalate antibiotics as soon as possible.


Panculture if new onset fever





ONCOLOGY/HEMATOLOGY/COAGULATION: 


Monitor for s/s of bleeding


Monitor hemoglobin, coagulation studies as needed





SKIN:


Pressure ulcer prevention per facility protocol


Specialty mattress





ORTHO/REHAB:


Continue PT/OT 





Prophylaxis:


Continue GI and DVT prophylaxis





Code Status: 


Full Resuscitation





Disposition:


TBD





Other:


Total patient care time exceeds 35 minutes excluding all procedures.











ANA SHERIDAN              Feb 23, 2025 15:25

## 2025-02-23 NOTE — PN
INFECTIOUS DISEASE  PROGRESS NOTE








Date of Service: Feb 23, 2025





SUBJECTIVE:


This 73 year old male patient is being seen today at bedside. No fever or 

chills. No nausea or vomiting. Patient denies chest pain or shortness of breath.

No abdominal pain, dysuria or hematuria. Patient is calm at this time. Patient 

remains on cefepime and diflucan tolerating well. WBC 13.6 . No acute events 

reported by nurse.








PHYSICAL EXAM


EYES:  Anicteric.  Pupils equal and reactive.


HENT: No oral thrush seen, moist,  Oral mucositis.


NECK:  Supple, no JVD or thyromegaly.


LUNGS:  Good air entry.  No rales, no rhonchi.  Crackles.


CARDIOVASCULAR:  S1, S2 regular.  No murmur heard.


ABDOMEN:  Soft, non tender, bowel sounds present, no organomegaly.


CENTRAL NERVOUS SYSTEM:  Awake, alert, oriented x 3.  


SKIN:  No rashes, no swelling.


LYMPHATICS: No peripheral lymphadenopathy.


MUSCULOSKELETAL:  No joint swelling, erythema or tenderness.


EXTREMITIES:  No cyanosis or clubbing.


BACK:  No deformity, no pressure ulcer.


GENITOURINARY:  No dysuria or hematuria. 








Vital Sign (Last 12 Hours)








 2/23/25 2/23/25 2/23/25 2/23/25





 07:06 07:07 08:00 11:24


 


Temp   99.0 


 


Pulse 89 89 99 81


 


Resp 18 18 20 18


 


B/P (MAP)   129/67 


 


Pulse Ox   95 


 


O2 Delivery N/A Room Air  Room Air 


 


FiO2 21  21 


 


    





 2/23/25   





 11:43   


 


Temp 98.8   


 


Pulse 85   


 


Resp 18   


 


B/P (MAP) 132/68   


 


Pulse Ox 96   


 


O2 Delivery Room Air   


 


FiO2 21   











LABS:








                                   Laboratory:








Test


 2/23/25


11:42 Range/Units


 


 


White Blood Count 13.6 H 4.8-10.8  K/uL


 


Red Blood Count


 3.57 L


 4.50-6.20


MIL/uL


 


Hemoglobin 10.8 L 14.0-18.0  g/dL


 


Hematocrit 32.1 L 42-54  %


 


Mean Corpuscular Volume 89.9  79-99  fL


 


Mean Corpuscular Hemoglobin 30.3  27.0-33.0  pg


 


Mean Corpuscular Hemoglobin


Concent 33.6 


 32.0-36.0  g/dL





 


Red Cell Distribution Width 14.4  11.0-15.5  %


 


Platelet Count 261 # 130-400  K/uL


 


Mean Platelet Volume 9.4  7.5-10.5  fL


 


Immature Granulocyte % (Auto) 4.4 H 0-1  %


 


Neutrophils (%) (Auto) 71.9  40.0-77.0  %


 


Lymphocytes (%) (Auto) 10.5 L 21.0-51.0  %


 


Monocytes (%) (Auto) 12.4  3.0-13.0  %


 


Eosinophils (%) (Auto) 0.0  0.0-8.0  %


 


Basophils (%) (Auto) 0.8  0.0-5.0  %


 


Neutrophils # (Auto) 9.8 H 1.8-7.7  K/uL


 


Lymphocytes # (Auto) 1.4  1.0-4.8  K/uL


 


Monocytes # (Auto) 1.7 H 0.1-1.0  K/uL


 


Eosinophils # (Auto) 0.00  0.00-0.70  K/uL


 


Basophils # (Auto) 0.11  0.00-0.20  K/uL


 


Absolute Immature Granulocyte


(auto 0.60 


 0-1  K/uL





 


Nucleated Red Blood Cells 0.1  0.0-0.19  %


 


Sodium Level 131 L 136-145  mmol/L


 


Potassium Level 3.5  3.5-5.1  mmol/L


 


Chloride Level 99 L 101-111  mmol/L


 


Carbon Dioxide Level 28  21-32  mmol/L


 


Blood Urea Nitrogen 7  7-18  mg/dL


 


Creatinine 0.7  0.5-1.3  mg/dL


 


Glomerular Filtration Rate


Calc 97 


 >90  mL/min





 


Random Glucose 88    mg/dL


 


Total Calcium 8.3 L 8.5-10.1  mg/dL


 


Total Bilirubin 0.3  0.2-1.0  mg/dL


 


Aspartate Amino Transf


(AST/SGOT) 19 


 10-37  U/L





 


Alanine Aminotransferase


(ALT/SGPT) 21 


 12-78  U/L





 


Alkaline Phosphatase 107    U/L


 


Total Protein 7.5  6.0-8.3  g/dL


 


Albumin 2.6 L 3.5-5.0  g/dL








REASON: PNA


ORDERING PHYSICIAN: ANA SHERIDAN


PROCEDURE: CXR1VW  -  CHEST 1VW





CHEST 1VW





HISTORY: Pneumonia





COMPARISON: None





FINDINGS: A frontal projection of the chest was obtained. There are


bilateral lower lung reticular nodular pulmonary infiltrates. There are


superimposed bilateral lower lung nodules.   The heart is borderline


enlarged.  Port-A-Cath is seen entering from the right.  No evidence of


aortic calcification is seen.





IMPRESSION:


1. Bilateral lower lung reticular nodular pulmonary infiltrates,


nodules. There are superimposed bilateral lower lung nodules. 














ASSESSMENT:


Healthcare associated pneumonia.  


Sepsis.  


Chemotherapy induced neutropenia improved. 


Esophageal cancer.  


Underlying immunosuppression.  


Oral Mucositis. 


Hyponatremia.








PLAN:


Continue fluconazole.  


Continue cefepime.  


Continue Mycelex.  


Continue magic mouthwash.


We will follow up on the culture results.  


Follow Oncologist recommendations








This case was reviewed and discussed with my supervising physician and the above

assessment and plan was formulated and agreed upon.











ELVIA MERRITT             Feb 23, 2025 16:17

## 2025-02-24 NOTE — PN
[facility] PROGRESS NOTE 





Date Patient Seen: Feb 24, 2025 


Time of Visit: 15:46


Supervising Physician: JOAO ANDREW





Primary Care Physician: Wojciech Harper MD 


Outpatient Specialists: 


Inpatient Consults: Jaquan iBlls MD





PROBLEM LIST:


Sepsis, RESOLVED 


Neutropenic fever


Severe neutropenia, POA


PNA , + Candida 


Esophageal CA with Mets


Immunodeficiency


Maintenance chemotherapy 


History of hepatitis 


Hypertension 


Chronic Generalized rash to face and scalp worsening, POA





INTERVAL HISTORY:


Patient was seen with family at bedside, He is awake and alert and in good 

spirits on room air.  He remains on neutropenic precautions.  He is set to be 

discharged today. we advise a follow up in 2 weeks with pulmonary clinic. 


Tracheal cultures have revealed Candida. CXR reveals : Bilateral lower lung 

reticular nodular pulmonary infiltrates, nodules. There are superimposed 

bilateral lower lung nodules. 


Which are unchanged from prior studies and consistent with his history of 

metastasis.





REVIEW OF SYSTEMS: 


12 point ROS reviewed with patient. Pertinent positives mentioned above. 

Otherwise negative.





PHYSICAL EXAM: 


GENERAL: alert, weak, awake oriented x 3


HEENT: EOMI, Sclera non icteric, moist mucosa , generalized rash to face and 

scalp


NECK: Supple, no JVD, trachea midline 


LUNGS: diminished  breath sounds bilaterally. No wheezes


HEART: Regular rate and rhythm. Normal S1 and S2, without murmurs 


ABD: Abdomen soft, nontender. Bowel sounds present


EXT: No clubbing cyanosis or edema


NEURO: Alert and oriented to person, follows commands





                             Vital Signs (last 8hr)








  Date Time  Temp Pulse Resp B/P (MAP) Pulse Ox O2 Delivery O2 Flow Rate FiO2


 


2/24/25 12:00 98.6 95 20 145/73 100   


 


2/24/25 11:27  84 16     


 


2/24/25 08:09 98.8 91 20 143/71 93   











LABS:





                                Hematology Labs:








Test


 2/24/25


03:40 Range/Units


 


 


White Blood Count 35.8 #*H 4.8-10.8  K/uL


 


Red Blood Count


 3.73 L


 4.50-6.20


MIL/uL


 


Hemoglobin 11.4 L 14.0-18.0  g/dL


 


Hematocrit 33.3 L 42-54  %


 


Mean Corpuscular Volume 89.3  79-99  fL


 


Mean Corpuscular Hemoglobin 30.6  27.0-33.0  pg


 


Mean Corpuscular Hemoglobin


Concent 34.2 


 32.0-36.0  g/dL





 


Red Cell Distribution Width 14.6  11.0-15.5  %


 


Platelet Count 291  130-400  K/uL


 


Mean Platelet Volume 9.3  7.5-10.5  fL


 


Immature Granulocyte % (Auto) 5.6 H 0-1  %


 


Neutrophils (%) (Auto) 82.0 H 40.0-77.0  %


 


Lymphocytes (%) (Auto) 4.9 L 21.0-51.0  %


 


Monocytes (%) (Auto) 7.3  3.0-13.0  %


 


Eosinophils (%) (Auto) 0.0  0.0-8.0  %


 


Basophils (%) (Auto) 0.2  0.0-5.0  %


 


Neutrophils # (Auto) 29.3 H 1.8-7.7  K/uL


 


Lymphocytes # (Auto) 1.8  1.0-4.8  K/uL


 


Monocytes # (Auto) 2.6 H 0.1-1.0  K/uL


 


Eosinophils # (Auto) 0.01  0.00-0.70  K/uL


 


Basophils # (Auto) 0.07  0.00-0.20  K/uL


 


Absolute Immature Granulocyte


(auto 2.02 H


 0-1  K/uL





 


Segmented Neutrophils % 85 H 40-70  %


 


Lymphocytes % (Manual) 6 L 22-44  %


 


Monocytes % (Manual) 8  2-9  %


 


Basophils % (Manual) 1  0-2  %


 


Nucleated Red Blood Cells 0.1  0.0-0.19  %


 


Differential Comment


 MANUAL


DIFFERENTIAL  





 


White Cell Morphology Comment VACUOLATION 1+   


 


Platelet Morphology Comment ADEQUATE   


 


Red Blood Cell Morphology See comments   








                                 Chemistry Labs:








Test


 2/23/25


11:42 Range/Units


 


 


Sodium Level 131 L 136-145  mmol/L


 


Potassium Level 3.5  3.5-5.1  mmol/L


 


Chloride Level 99 L 101-111  mmol/L


 


Carbon Dioxide Level 28  21-32  mmol/L


 


Blood Urea Nitrogen 7  7-18  mg/dL


 


Creatinine 0.7  0.5-1.3  mg/dL


 


Glomerular Filtration Rate


Calc 97 


 >90  mL/min





 


Random Glucose 88    mg/dL


 


Total Calcium 8.3 L 8.5-10.1  mg/dL


 


Total Bilirubin 0.3  0.2-1.0  mg/dL


 


Aspartate Amino Transf


(AST/SGOT) 19 


 10-37  U/L





 


Alanine Aminotransferase


(ALT/SGPT) 21 


 12-78  U/L





 


Alkaline Phosphatase 107    U/L


 


Total Protein 7.5  6.0-8.3  g/dL


 


Albumin 2.6 L 3.5-5.0  g/dL











DIAGNOSTICS / RADIOLOGY RESULTS:


[ ]





PLAN 


Follow oncology recommendations 


Broad spectrum antibiotics


follow ID recs 


panculture 


Negative for thyphoid, Influenza A and B, covid and strep


CT of the chest reviewed + metastatic findings 


 





NEURO: 


Minimize central acting medications as possible. 


Maintain fall precautions, adequate lighting during the day





PULMONARY: 


Supplemental 02 as needed. 


Maintain aspiration precautions at all times





CARDIOVASCULAR: 


Follow hemodynamics. 


Vital signs per facility protocol





GI & NUTRITION:


Continue with nutritional support.


Continue stool softeners and laxatives as needed.





KIDNEYS & ELECTROLYTES: 


Strict monitoring of intake, output and overall fluid balance. 


Avoid nephrotoxic medications to the extent possible. 


Medications to be dosed according to renal function.


Monitor electrolytes and replace as needed





ENDOCRINE:


Maintain blood glucose between 100-180 at all times.


Hypoglycemia protocol in place





INFECTIOUS DISEASE: 


Trend temperature, WBC and procalcitonin level


Follow cultures, deescalate antibiotics as soon as possible.


Panculture if new onset fever





ONCOLOGY/HEMATOLOGY/COAGULATION: 


Monitor for s/s of bleeding


Monitor hemoglobin, coagulation studies as needed





SKIN:


Pressure ulcer prevention per facility protocol


Specialty mattress





ORTHO/REHAB:


Continue PT/OT 





Prophylaxis:


Continue GI and DVT prophylaxis





Code Status: 


Full Resuscitation





Disposition:


TBD





Other:


Total patient care time exceeds 35 minutes excluding all procedures.











ANA SHERIDAN              Feb 24, 2025 15:50

## 2025-02-24 NOTE — PN
INFECTIOUS DISEASE  PROGRESS NOTE








Date of Service: Feb 24, 2025





SUBJECTIVE:


This is a 73 year old male patient was seen and examined at bedside in room 407.

 


Patient is awake, alert and oriented x3.  No fever this morning, temperature is 

98.8.  The WBC went up to 35.8 and the Granix has been discontinued.  From 

Infectious Disease standpoint no antibiotic is needed on discharge.








PHYSICAL EXAM


EYES:  Anicteric.  Pupils equal and reactive.


HENT: No oral thrush seen, moist,  Oral mucositis.


NECK:  Supple, no JVD or thyromegaly.


LUNGS:  Good air entry.  No rales, no rhonchi.


CARDIOVASCULAR:  S1, S2 regular.  No murmur heard.


ABDOMEN:  Soft, non tender, bowel sounds present, no organomegaly.


CENTRAL NERVOUS SYSTEM:  Awake, alert, oriented x 3.  


SKIN:  No rashes, no swelling.


LYMPHATICS: No peripheral lymphadenopathy.


MUSCULOSKELETAL:  No joint swelling, erythema or tenderness.


EXTREMITIES:  No cyanosis or clubbing.


BACK:  No deformity, no pressure ulcer.


GENITOURINARY:  No dysuria or hematuria. 








Vital Sign (Last 12 Hours)








 2/24/25 2/24/25 2/24/25 2/24/25





 06:42 06:44 08:09 11:27


 


Temp   98.8 


 


Pulse 80 80 91 84


 


Resp 16  20 16


 


B/P (MAP)   143/71 


 


Pulse Ox   93 


 


O2 Delivery  N/A Room Air  


 


FiO2  21  


 


    





 2/24/25   





 12:00   


 


Temp 98.6   


 


Pulse 95   


 


Resp 20   


 


B/P (MAP) 145/73   


 


Pulse Ox 100   














Intake & Output (last 24hrs)   


 


 2/23/25 2/23/25 2/24/25





 15:00 23:00 07:00


 


Intake Total  1800 ml 


 


Balance  1800 ml 











LABS:








                                   Laboratory:








Test


 2/24/25


03:40 2/23/25


11:42 Range/Units


 


 


White Blood Count 35.8 #*H  4.8-10.8  K/uL


 


Red Blood Count


 3.73 L


 


 4.50-6.20


MIL/uL


 


Hemoglobin 11.4 L  14.0-18.0  g/dL


 


Hematocrit 33.3 L  42-54  %


 


Mean Corpuscular Volume 89.3   79-99  fL


 


Mean Corpuscular Hemoglobin 30.6   27.0-33.0  pg


 


Mean Corpuscular Hemoglobin


Concent 34.2 


 


 32.0-36.0  g/dL





 


Red Cell Distribution Width 14.6   11.0-15.5  %


 


Platelet Count 291   130-400  K/uL


 


Mean Platelet Volume 9.3   7.5-10.5  fL


 


Immature Granulocyte % (Auto) 5.6 H  0-1  %


 


Neutrophils (%) (Auto) 82.0 H  40.0-77.0  %


 


Lymphocytes (%) (Auto) 4.9 L  21.0-51.0  %


 


Monocytes (%) (Auto) 7.3   3.0-13.0  %


 


Eosinophils (%) (Auto) 0.0   0.0-8.0  %


 


Basophils (%) (Auto) 0.2   0.0-5.0  %


 


Neutrophils # (Auto) 29.3 H  1.8-7.7  K/uL


 


Lymphocytes # (Auto) 1.8   1.0-4.8  K/uL


 


Monocytes # (Auto) 2.6 H  0.1-1.0  K/uL


 


Eosinophils # (Auto) 0.01   0.00-0.70  K/uL


 


Basophils # (Auto) 0.07   0.00-0.20  K/uL


 


Absolute Immature Granulocyte


(auto 2.02 H


 


 0-1  K/uL





 


Segmented Neutrophils % 85 H  40-70  %


 


Lymphocytes % (Manual) 6 L  22-44  %


 


Monocytes % (Manual) 8   2-9  %


 


Basophils % (Manual) 1   0-2  %


 


Nucleated Red Blood Cells 0.1   0.0-0.19  %


 


Differential Comment


 MANUAL


DIFFERENTIAL 


  





 


White Cell Morphology Comment VACUOLATION 1+    


 


Platelet Morphology Comment ADEQUATE    


 


Red Blood Cell Morphology See comments    


 


Sodium Level  131 L 136-145  mmol/L


 


Potassium Level  3.5  3.5-5.1  mmol/L


 


Chloride Level  99 L 101-111  mmol/L


 


Carbon Dioxide Level  28  21-32  mmol/L


 


Blood Urea Nitrogen  7  7-18  mg/dL


 


Creatinine  0.7  0.5-1.3  mg/dL


 


Glomerular Filtration Rate


Calc 


 97 


 >90  mL/min





 


Random Glucose  88    mg/dL


 


Total Calcium  8.3 L 8.5-10.1  mg/dL


 


Total Bilirubin  0.3  0.2-1.0  mg/dL


 


Aspartate Amino Transf


(AST/SGOT) 


 19 


 10-37  U/L





 


Alanine Aminotransferase


(ALT/SGPT) 


 21 


 12-78  U/L





 


Alkaline Phosphatase  107    U/L


 


Total Protein  7.5  6.0-8.3  g/dL


 


Albumin  2.6 L 3.5-5.0  g/dL











ASSESSMENT:


Healthcare associated pneumonia.  


Sepsis.  


Chemotherapy induced neutropenia improved. 


Esophageal cancer.  


Underlying immunosuppression.  


Oral Mucositis, resolving. 


Hyponatremia.








PLAN:


No antibiotics needed on discharge from Infectious Disease standpoint.





This case was reviewed and discussed with my supervising physician and the above

assessment and plan was formulated and agreed upon.


ATTESTATION BY PHYSICIAN


I have seen and examined the patient. I reviewed the documentation, medical 

decision making, and treatment plan as noted by the mid-level provider above. I 

agree with the findings and plan of care.











TOO COFFEY MD, MIRTA L [facility]             Feb 24, 2025 16:24

## 2025-02-24 NOTE — PN
SUBJECTIVE:  Comfortable, afebrile.  No fever or chills.  No chest pain. No 

nausea or vomiting.  He is off oxygen.



OBJECTIVE:

GENERAL:  Currently comfortable in bed, not in distress.

VITAL SIGNS:  In the chart.

HEENT:  Normocephalic, atraumatic.

LUNGS:  Clear to auscultation.

HEART:  S1, S2 are distant.

ABDOMEN:  Soft, nontender.

EXTREMITIES:  No clubbing or cyanosis.



LABORATORY DATA:  WBC count 13.6, hemoglobin 10.8, platelets 261.  Sodium 131, 

potassium 3.5, BUN 7, creatinine 0.7, calcium 8.3, albumin 2.6.



ASSESSMENT AND PLAN:

   * Neutropenic fever, resolved.  Cultures were positive for Diflucan.

   * Positive sputum culture for Candida.  Continue Diflucan.

   * Absolute neutropenia, chemotherapy-induced.  Continue recommendations by 

     Oncology.

   * Plan to discharge with clearance from Oncology.  Follow up in my office 

     tomorrow at 2:00 p.m.





DOS: 02/23/2025

DD: 02/23/2025 02:43 PM

DT: 02/23/2025 07:06 PM

TID: [id number] RECEIPT: [id number]

MTDD